# Patient Record
Sex: MALE | Race: WHITE | Employment: OTHER | URBAN - METROPOLITAN AREA
[De-identification: names, ages, dates, MRNs, and addresses within clinical notes are randomized per-mention and may not be internally consistent; named-entity substitution may affect disease eponyms.]

---

## 2017-11-07 ENCOUNTER — TRANSFERRED RECORDS (OUTPATIENT)
Dept: HEALTH INFORMATION MANAGEMENT | Facility: CLINIC | Age: 57
End: 2017-11-07

## 2018-02-05 ENCOUNTER — TRANSFERRED RECORDS (OUTPATIENT)
Dept: HEALTH INFORMATION MANAGEMENT | Facility: CLINIC | Age: 58
End: 2018-02-05

## 2018-02-05 LAB
CREAT SERPL-MCNC: 1.01 MG/DL (ref 0.7–1.2)
GFR SERPL CREATININE-BSD FRML MDRD: >60 ML/MIN/1.73M2
GLUCOSE SERPL-MCNC: 98 MG/DL (ref 70–100)
HEP C HIM: NORMAL
POTASSIUM SERPL-SCNC: 4.6 MEQ/L (ref 3.4–5.1)

## 2018-09-11 ENCOUNTER — TRANSFERRED RECORDS (OUTPATIENT)
Dept: HEALTH INFORMATION MANAGEMENT | Facility: CLINIC | Age: 58
End: 2018-09-11

## 2018-10-18 ENCOUNTER — TELEPHONE (OUTPATIENT)
Dept: UROLOGY | Facility: CLINIC | Age: 58
End: 2018-10-18

## 2018-10-18 NOTE — TELEPHONE ENCOUNTER
PREVISIT INFORMATION                                                    Yovani Renuka scheduled for future visit at [facility].    Patient is scheduled to see Dr. Ashraf on 11/1/18   Reason for visit: Urinary frequency  Referring provider: Azul  Has patient seen previous specialist? No  Medical Records:  Unkown    REVIEW                                                      New patient packet mailed to patient: No  Medication reconciliation complete: No      PLAN/FOLLOW-UP NEEDED                                                      Patient Reminders Given:    Informed patient to bring an updated list of allergies, medications, pharmacy details and insurance information. Directed patient to come to the 2nd floor, check-in D for their appointment. Informed patient to call back if appointment needs to be cancelled or rescheduled at [phone].    Reminded patient to bring any outside records regarding this appointment or have them faxed to clinic at [phone].    Reminded patient to complete and bring in urology questionnaire. Also for patient to please come with a full bladder and to ask , if early to get staff member for sample.    Left message asking pt to return call so outside records can be obtained prior to visit.    Harika Aleman LPN

## 2018-10-19 NOTE — TELEPHONE ENCOUNTER
Returned call and spoke to patient who reports that he is self-referred. Per patient, he was previously seen at [facility]. Requested for patient to contact  to have records faxed to [phone]. Patient aware of appointment date, time, and location and will call with any questions.    Teresita Overton RN, BSN

## 2018-11-01 ENCOUNTER — OFFICE VISIT (OUTPATIENT)
Dept: UROLOGY | Facility: CLINIC | Age: 58
End: 2018-11-01
Payer: COMMERCIAL

## 2018-11-01 VITALS
DIASTOLIC BLOOD PRESSURE: 77 MMHG | HEART RATE: 48 BPM | WEIGHT: 189 LBS | OXYGEN SATURATION: 100 % | SYSTOLIC BLOOD PRESSURE: 121 MMHG

## 2018-11-01 DIAGNOSIS — N52.9 ERECTILE DYSFUNCTION, UNSPECIFIED ERECTILE DYSFUNCTION TYPE: ICD-10-CM

## 2018-11-01 DIAGNOSIS — R35.1 NOCTURIA: ICD-10-CM

## 2018-11-01 DIAGNOSIS — R39.15 URINARY URGENCY: Primary | ICD-10-CM

## 2018-11-01 PROCEDURE — 99203 OFFICE O/P NEW LOW 30 MIN: CPT | Performed by: UROLOGY

## 2018-11-01 RX ORDER — SILDENAFIL CITRATE 20 MG/1
20 TABLET ORAL PRN
Refills: 2 | COMMUNITY
Start: 2018-08-07 | End: 2019-12-03

## 2018-11-01 ASSESSMENT — PAIN SCALES - GENERAL: PAINLEVEL: MILD PAIN (2)

## 2018-11-01 NOTE — NURSING NOTE
Yovani Grayson's goals for this visit include:   Chief Complaint   Patient presents with     Consult     Urinary frequency/urgency       He requests these members of his care team be copied on today's visit information: Yes    PCP: No primary care provider on file.    Referring Provider:  Eliazar Ashraf MD  [address] [place] [id number]    /77 (BP Location: Left arm, Patient Position: Sitting, Cuff Size: Adult Regular)  Pulse (!) 48  Wt 85.7 kg (189 lb)  SpO2 100%    Do you need any medication refills at today's visit? No

## 2018-11-01 NOTE — PROGRESS NOTES
Urology Consult History and Physical  OBED GAMBINO  Name: Yovani Grayson    MRN: [id number]   YOB: 1960       We were asked to see Yovani Grayson at the request of SELF for evaluation and treatment of Urinary frequency and urgency.        Chief Complaint:   Urinary frequency and urgency     History is obtained from the patient            History of Present Illness:   oYvani Grayson is a 57 year old male who is being seen for evaluation of urinary frequency and urgency with nocturia.    He notes that nocturia has been happening 2-3 times per night for several years. 1 month ago he developed worsening of frequency and urgency. This persisted for about 2 weeks and since mostly resolved. He continues to have some urgency and is conscious of PO fluid intake and timing of voids. Overall not too bothered by this.     He has Erectile dysfunction and uses sildenafil with good response.     He was also having bilateral hip pain and stopped taking his Statin and this pain has greatly improved.     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    AUASS: 1-3-1-4-2-1-3 = 15  QOL: 2    PSA  9/12/18     0.50  2/5/18       0.54  5/31/17     0.43    (4 or >)  Location: Prostate  Quality: urgency  Severity: mild  Duration: months           Past Medical History:     Past Medical History:   Diagnosis Date     Erectile dysfunction             Past Surgical History:     Past Surgical History:   Procedure Laterality Date     HERNIORRHAPHY UMBILICAL              Social History:     Social History   Substance Use Topics     Smoking status: Never Smoker     Smokeless tobacco: Never Used     Alcohol use No       History   Smoking Status     Never Smoker   Smokeless Tobacco     Never Used            Family History:   No family history on file.           Allergies:   No Known Allergies         Medications:     Current Outpatient Prescriptions   Medication Sig     sildenafil (REVATIO) 20 MG tablet Take 20  mg by mouth as needed     No current facility-administered medications for this visit.              Review of Systems:     Skin: negative  Eyes: negative  Ears/Nose/Throat: negative  Respiratory: No shortness of breath, dyspnea on exertion, cough, or hemoptysis  Cardiovascular: negative  Gastrointestinal: negative  Genitourinary: as above  Musculoskeletal: negative  Neurologic: negative  Psychiatric: negative  Hematologic/Lymphatic/Immunologic: negative  Endocrine: negative          Physical Exam:     Patient Vitals for the past 24 hrs:   BP Pulse SpO2 Weight   11/01/18 0934 121/77 (!) 48 100 % 85.7 kg (189 lb)     There is no height or weight on file to calculate BMI.     General: age-appropriate appearing male in NAD  HEENT: Head AT/NC, EOMI, CN Grossly intact  Lungs: no respiratory distress, or pursed lip breathing  Heart: No obvious jugular venous distension present  Back: no bony midline tenderness, no CVAT bilaterally.  Abdomen: soft, non-distended, non-tender. No organomegaly  : normal male external genitalia.   Rectal: 25cc gland, no nodules or induration  Lymph: no palpable inguinal lymphadenopathy.  LE: no edema.   Musculoskeltal: extremities normal, no peripheral edema  Skin: no suspicious lesions or rashes  Neuro: Alert, oriented, speech and mentation normal;  moving all 4 extremities equally.  Psych: affect and mood normal          Data:   All laboratory data reviewed:    PSA  9/12/18     0.50  2/5/18       0.54  5/31/17     0.43    Lab Results   Component Value Date    CR 1.01 02/05/2018               Impression and Plan:   Impression:   56 y/o man with history of nocturia, urinary urgency and Erectile dysfunction. The voiding symptoms have largely improved and his main issues is urgency which he is not too bothered by at this time.       Plan:   Nocturia  - We discussed life style modification with limiting evening PO fluid intake and afternoon LE elevation  - He is overall not too bothered by  this    Urinary urgency  - We discussed the possibility of Flomax to improve urination or possibly anti-cholinergic to treat any component of OAB (over-active bladder), however he is not too bothered by these symptoms and prefers to avoid medications  - F/U PRN    Erectile dysfunction   - Doing well with sildenafil  - received script from NP at school system       Time spent: 30 minutes of which >50% was spent counseling.    Eliazar Ashraf MD   Urology  [facility]  [facility] Phone: [phone]  [facility] Phone: [phone]

## 2018-11-01 NOTE — MR AVS SNAPSHOT
After Visit Summary   2018    Yovani Grayson    MRN: [id number]           Patient Information     Date Of Birth          1960        Visit Information        Provider Department      2018 9:30 AM Eliazar Ashraf MD [facility]        Today's Diagnoses     Urinary urgency    -  1    Erectile dysfunction, unspecified erectile dysfunction type        Nocturia           Follow-ups after your visit        Follow-up notes from your care team     Return if symptoms worsen or fail to improve.      Who to contact     If you have questions or need follow up information about today's clinic visit or your schedule please contact [facility] directly at [phone].  Normal or non-critical lab and imaging results will be communicated to you by MyChart, letter or phone within 4 business days after the clinic has received the results. If you do not hear from us within 7 days, please contact the clinic through MyChart or phone. If you have a critical or abnormal lab result, we will notify you by phone as soon as possible.  Submit refill requests through [employer] or call your pharmacy and they will forward the refill request to us. Please allow 3 business days for your refill to be completed.          Additional Information About Your Visit        MyChart Information     [employer] is an electronic gateway that provides easy, online access to your medical records. With [employer], you can request a clinic appointment, read your test results, renew a prescription or communicate with your care team.     To sign up for [employer] visit the website at [email or web address]   You will be asked to enter the access code listed below, as well as some personal information. Please follow the directions to create your username and password.     Your access code is: [email or web address]  Expires: 2019 10:09 AM     Your access code will  in 90 days. If you need help or a new code,  please contact your [facility] or call [phone] for assistance.        Care EveryWhere ID     This is your Care EveryWhere ID. This could be used by other organizations to access your [facility] medical records  [id number]        Your Vitals Were     Pulse Pulse Oximetry                48 100%           Blood Pressure from Last 3 Encounters:   11/01/18 121/77    Weight from Last 3 Encounters:   11/01/18 85.7 kg (189 lb)              Today, you had the following     No orders found for display       Primary Care Provider    None Specified       No primary provider on file.        Equal Access to Services     [facility]: Hadii aad mick hadasho Somarylou, waaxda luqadaha, qaybta kaalmada rosiyakey, sulema rosales . So [facility] [phone].    ATENCIÓN: Si habla español, tiene a vides disposición servicios gratuitos de asistencia lingüística. Llame al [phone].    We comply with applicable federal civil rights laws and [place] laws. We do not discriminate on the basis of race, color, national origin, age, disability, sex, sexual orientation, or gender identity.            Thank you!     Thank you for choosing [facility]  for your care. Our goal is always to provide you with excellent care. Hearing back from our patients is one way we can continue to improve our services. Please take a few minutes to complete the written survey that you may receive in the mail after your visit with us. Thank you!             Your Updated Medication List - Protect others around you: Learn how to safely use, store and throw away your medicines at www.disposemymeds.org.          This list is accurate as of 11/1/18 10:09 AM.  Always use your most recent med list.                   Brand Name Dispense Instructions for use Diagnosis    sildenafil 20 MG tablet    REVATIO     Take 20 mg by mouth as needed

## 2019-02-07 ENCOUNTER — TELEPHONE (OUTPATIENT)
Dept: FAMILY MEDICINE | Facility: CLINIC | Age: 59
End: 2019-02-07

## 2019-02-07 ENCOUNTER — ANCILLARY PROCEDURE (OUTPATIENT)
Dept: GENERAL RADIOLOGY | Facility: CLINIC | Age: 59
End: 2019-02-07
Attending: PHYSICIAN ASSISTANT
Payer: COMMERCIAL

## 2019-02-07 ENCOUNTER — OFFICE VISIT (OUTPATIENT)
Dept: FAMILY MEDICINE | Facility: CLINIC | Age: 59
End: 2019-02-07
Payer: COMMERCIAL

## 2019-02-07 VITALS
SYSTOLIC BLOOD PRESSURE: 110 MMHG | BODY MASS INDEX: 24.77 KG/M2 | HEART RATE: 65 BPM | DIASTOLIC BLOOD PRESSURE: 62 MMHG | HEIGHT: 74 IN | OXYGEN SATURATION: 100 % | RESPIRATION RATE: 18 BRPM | WEIGHT: 193 LBS | TEMPERATURE: 98.1 F

## 2019-02-07 DIAGNOSIS — L98.9 SKIN LESION: Primary | ICD-10-CM

## 2019-02-07 DIAGNOSIS — M25.552 HIP PAIN, LEFT: ICD-10-CM

## 2019-02-07 DIAGNOSIS — Z13.220 SCREENING FOR HYPERLIPIDEMIA: ICD-10-CM

## 2019-02-07 DIAGNOSIS — Z13.1 SCREENING FOR DIABETES MELLITUS: ICD-10-CM

## 2019-02-07 PROCEDURE — 99204 OFFICE O/P NEW MOD 45 MIN: CPT | Performed by: PHYSICIAN ASSISTANT

## 2019-02-07 PROCEDURE — 73502 X-RAY EXAM HIP UNI 2-3 VIEWS: CPT | Mod: FY

## 2019-02-07 RX ORDER — TRIAMCINOLONE ACETONIDE 1 MG/G
OINTMENT TOPICAL 2 TIMES DAILY
Qty: 60 G | Refills: 0 | Status: SHIPPED | OUTPATIENT
Start: 2019-02-07 | End: 2019-04-22

## 2019-02-07 ASSESSMENT — PAIN SCALES - GENERAL: PAINLEVEL: MODERATE PAIN (4)

## 2019-02-07 ASSESSMENT — MIFFLIN-ST. JEOR: SCORE: 1752.25

## 2019-02-07 NOTE — PATIENT INSTRUCTIONS
Follow up with orthopedics at [facility] ([phone]) for left hip pain    Follow up with dermatology- may try triamcinolone cream to lesions twice a day for 14 days to see if helpful     Please schedule a fasting lab appointment (nothing to eat or drink 9 hours prior except water and/or your medications) at your earliest convenience.          Follow up with orthopedics for left hip pain

## 2019-02-07 NOTE — TELEPHONE ENCOUNTER
This writer attempted to contact Yovani on 02/07/19      Reason for call provider's instructions below and left message.      If patient calls back:   Registered Nurse called. Follow Triage Call workflow        Marah Medrano RN

## 2019-02-07 NOTE — PROGRESS NOTES
SUBJECTIVE:   Yovani Grayson is a 58 year old male who presents to clinic today for the following health issues:    Rash  Onset: 6 months    Description:   Location: face, arms, side and leg, back  Character: flakey  Itching (Pruritis): occasionally    Progression of Symptoms:  worsening    Accompanying Signs & Symptoms:  Fever: no   Body aches or joint pain: YES  Sore throat symptoms: no   Recent cold symptoms: no     History:   Previous similar rash: no     Precipitating factors:   Exposure to similar rash: no   New exposures: None   Recent travel: no     Alleviating factors:  none    Therapies Tried and outcome: none      Has had several colonoscopies - used to run marathons and after long run 20 miles or more blood in stool - performed in  [place] [address]- recommended repeating in 10 yrs given normal findings   Moved in 2014    Spots in various areas of body present over the last 6 months   Scaling more on back and larger ones - looks more tender and itches a bit .   Under left arm started more red and pimple in middle  And now not as red- 6 months   Now changed into more flesh colored scaling  Overall worse last six months and cheeks getting more red and rough.   Face doesn't itch. One on back itches a bit  Chronic hip pain left hip 6 months- worse in am and has kept awake at night and to point actually get up and take ibuprofen.  Tried resting it without improvement   Does run -end of October 10K run.  Did Rest but didn't get better   Shoulder right pain as well for approximately one month    Noticeable.  Rates shoulder pain 2/10 hip varies 0/10 to 4/10  Is a   No history of skin cancer no family history  Of skin cancer  Has had some precancerous lesions treated     Problem list and histories reviewed & adjusted, as indicated.  Additional history: as documented    There is no problem list on file for this patient.    Past Surgical History:   Procedure Laterality Date     HERNIORRHAPHY UMBILICAL  "        Social History     Tobacco Use     Smoking status: Never Smoker     Smokeless tobacco: Never Used   Substance Use Topics     Alcohol use: No     Family History   Problem Relation Age of Onset     Hypertension Father          age 59 lung cancer      Lung Cancer Father      Breast Cancer Mother      Diabetes No family hx of      Colon Cancer No family hx of      Coronary Artery Disease No family hx of          Current Outpatient Medications   Medication Sig Dispense Refill            sildenafil (REVATIO) 20 MG tablet Take 20 mg by mouth as needed  2     BP Readings from Last 3 Encounters:   19 110/62   18 121/77    Wt Readings from Last 3 Encounters:   19 87.5 kg (193 lb)   18 85.7 kg (189 lb)                    Reviewed and updated as needed this visit by clinical staff  Tobacco  Allergies  Meds  Problems  Med Hx  Surg Hx  Fam Hx  Soc Hx        Reviewed and updated as needed this visit by Provider  Tobacco  Allergies  Meds  Problems  Med Hx  Surg Hx  Fam Hx  Soc Hx          ROS:   ROS: 10 point ROS neg other than the symptoms noted above in the HPI.    OBJECTIVE:     /62 (BP Location: Right arm, Patient Position: Chair, Cuff Size: Adult Large)   Pulse 65   Temp 98.1  F (36.7  C) (Oral)   Resp 18   Ht 1.867 m (6' 1.5\")   Wt 87.5 kg (193 lb)   SpO2 100%   BMI 25.12 kg/m    Body mass index is 25.12 kg/m .  GENERAL: healthy, alert and no distress  NECK: no adenopathy, no asymmetry, masses, or scars and thyroid normal to palpation  RESP: lungs clear to auscultation - no rales, rhonchi or wheezes  CV: regular rate and rhythm, normal S1 S2, no S3 or S4, no murmur, click or rub, no peripheral edema and peripheral pulses strong  ABDOMEN: soft, nontender, no hepatosplenomegaly, no masses and bowel sounds normal  MS: left hip without tenderness in groin.  Normal range of motion of hip.  Not particularly painful with range of motion   SKIN: several circular " erythematous scaling plaques noted scattered over the body. No areas of central clearing.  Face with thickened scaling red skin as well     Diagnostic Test Results:  Left hip xray with degenerative changes official radiology report pending     ASSESSMENT/PLAN:             1. Skin lesion  Possibly eczema or possibly guttate psoriasis- trial of traimcinolone on body lesions- not on face   - triamcinolone (KENALOG) 0.1 % external ointment; Apply topically 2 times daily for 14 days  Dispense: 60 g; Refill: 0  - DERMATOLOGY REFERRAL    2. Hip pain, left  Referred to orthopedics for further evaluation and consideration of steroid injection   - XR Hip Left 2-3 Views; Future  - ORTHOPEDICS ADULT REFERRAL    3. Screening for diabetes mellitus  Encouraged to return for fasting labs   - Comprehensive metabolic panel; Future    4. Screening for hyperlipidemia  Encouraged to return for fasting labs  Has been on statin in past but nothing recent    - Lipid panel reflex to direct LDL Fasting; Future    Patient Instructions   Follow up with orthopedics at [facility] ([phone]) for left hip pain    Follow up with dermatology- may try triamcinolone cream to lesions twice a day for 14 days to see if helpful     Please schedule a fasting lab appointment (nothing to eat or drink 9 hours prior except water and/or your medications) at your earliest convenience.          Follow up with orthopedics for left hip pain       Eva Viramontes PA-C  [facility]

## 2019-02-07 NOTE — TELEPHONE ENCOUNTER
Please call and advise not to use triamcinolone ointment on face- I dont' think I made that clear at today's appointment - ok to use on rest of body  If gets worse we would think of fungal component- follow up with us if worse and derm if no improvement

## 2019-02-08 NOTE — TELEPHONE ENCOUNTER
Called and informed the patient of the provider information as written below.    Teresita Matos RN, [facility]

## 2019-02-13 DIAGNOSIS — Z13.220 SCREENING FOR HYPERLIPIDEMIA: ICD-10-CM

## 2019-02-13 DIAGNOSIS — Z13.1 SCREENING FOR DIABETES MELLITUS: ICD-10-CM

## 2019-02-13 LAB
ALBUMIN SERPL-MCNC: 3.8 G/DL (ref 3.4–5)
ALP SERPL-CCNC: 79 U/L (ref 40–150)
ALT SERPL W P-5'-P-CCNC: 26 U/L (ref 0–70)
ANION GAP SERPL CALCULATED.3IONS-SCNC: 4 MMOL/L (ref 3–14)
AST SERPL W P-5'-P-CCNC: 22 U/L (ref 0–45)
BILIRUB SERPL-MCNC: 0.6 MG/DL (ref 0.2–1.3)
BUN SERPL-MCNC: 15 MG/DL (ref 7–30)
CALCIUM SERPL-MCNC: 8.5 MG/DL (ref 8.5–10.1)
CHLORIDE SERPL-SCNC: 107 MMOL/L (ref 94–109)
CHOLEST SERPL-MCNC: 246 MG/DL
CO2 SERPL-SCNC: 28 MMOL/L (ref 20–32)
CREAT SERPL-MCNC: 1.1 MG/DL (ref 0.66–1.25)
GFR SERPL CREATININE-BSD FRML MDRD: 73 ML/MIN/{1.73_M2}
GLUCOSE SERPL-MCNC: 92 MG/DL (ref 70–99)
HDLC SERPL-MCNC: 55 MG/DL
LDLC SERPL CALC-MCNC: 162 MG/DL
NONHDLC SERPL-MCNC: 191 MG/DL
POTASSIUM SERPL-SCNC: 4.6 MMOL/L (ref 3.4–5.3)
PROT SERPL-MCNC: 7.6 G/DL (ref 6.8–8.8)
SODIUM SERPL-SCNC: 139 MMOL/L (ref 133–144)
TRIGL SERPL-MCNC: 144 MG/DL

## 2019-02-13 PROCEDURE — 80053 COMPREHEN METABOLIC PANEL: CPT | Performed by: PHYSICIAN ASSISTANT

## 2019-02-13 PROCEDURE — 80061 LIPID PANEL: CPT | Performed by: PHYSICIAN ASSISTANT

## 2019-02-13 PROCEDURE — 36415 COLL VENOUS BLD VENIPUNCTURE: CPT | Performed by: PHYSICIAN ASSISTANT

## 2019-02-13 NOTE — LETTER
[address]  [address]  [phone]    February 13, 2019      Yovani Grayson  [address] [place] [address]        Dear Yovani     Your electrolytes, blood sugar, kidney function and liver function were normal.   Your cholesterol is high. Poor diet, genetics and being overweight can contribute to this.  Maintaining a healthy diet with lean proteins, whole grains and healthy fats such as olive oil as well as regular exercise and maintaining an appropriate weight all contribute to healthier cholesterol levels.     Schedule an appointment to discuss this further and for recommendations for medications.  I would recommend going back on a statin.   Please call or MyChart my office with any questions or concerns.       Eva Viramontes, PAC

## 2019-02-19 ENCOUNTER — OFFICE VISIT (OUTPATIENT)
Dept: ORTHOPEDICS | Facility: CLINIC | Age: 59
End: 2019-02-19
Payer: COMMERCIAL

## 2019-02-19 VITALS
SYSTOLIC BLOOD PRESSURE: 144 MMHG | DIASTOLIC BLOOD PRESSURE: 76 MMHG | BODY MASS INDEX: 24.77 KG/M2 | HEIGHT: 74 IN | RESPIRATION RATE: 13 BRPM | OXYGEN SATURATION: 99 % | HEART RATE: 68 BPM | WEIGHT: 193 LBS

## 2019-02-19 DIAGNOSIS — M16.12 PRIMARY OSTEOARTHRITIS OF LEFT HIP: ICD-10-CM

## 2019-02-19 DIAGNOSIS — M50.10 CERVICAL DISC SYNDROME: ICD-10-CM

## 2019-02-19 PROCEDURE — 99203 OFFICE O/P NEW LOW 30 MIN: CPT | Performed by: ORTHOPAEDIC SURGERY

## 2019-02-19 ASSESSMENT — MIFFLIN-ST. JEOR: SCORE: 1752.25

## 2019-02-19 NOTE — LETTER
2019         RE: Yovani Grayson  [address]  [place] [facility] [address]        Dear Colleague,    Thank you for referring your patient, Yovani Grayson, to the [facility]. Please see a copy of my visit note below.    Yovani Grayson is a 58 year old male who is seen in consultation at the request of Eva Viramontes  for left hip pain and right shoulder pain.     Past Medical History:   Diagnosis Date     Erectile dysfunction        Past Surgical History:   Procedure Laterality Date     HERNIORRHAPHY UMBILICAL         Family History   Problem Relation Age of Onset     Hypertension Father          age 59 lung cancer      Lung Cancer Father      Breast Cancer Mother      Diabetes No family hx of      Colon Cancer No family hx of      Coronary Artery Disease No family hx of        Social History     Socioeconomic History     Marital status:      Spouse name: Not on file     Number of children: Not on file     Years of education: Not on file     Highest education level: Not on file   Occupational History     Not on file   Social Needs     Financial resource strain: Not on file     Food insecurity:     Worry: Not on file     Inability: Not on file     Transportation needs:     Medical: Not on file     Non-medical: Not on file   Tobacco Use     Smoking status: Never Smoker     Smokeless tobacco: Never Used   Substance and Sexual Activity     Alcohol use: No     Drug use: No     Sexual activity: Yes     Partners: Female   Lifestyle     Physical activity:     Days per week: Not on file     Minutes per session: Not on file     Stress: Not on file   Relationships     Social connections:     Talks on phone: Not on file     Gets together: Not on file     Attends Caodaism service: Not on file     Active member of club or organization: Not on file     Attends meetings of clubs or organizations: Not on file     Relationship status: Not on file     Intimate partner violence:     Fear of  "current or ex partner: Not on file     Emotionally abused: Not on file     Physically abused: Not on file     Forced sexual activity: Not on file   Other Topics Concern     Not on file   Social History Narrative     Not on file       Current Outpatient Medications   Medication Sig Dispense Refill     sildenafil (REVATIO) 20 MG tablet Take 20 mg by mouth as needed  2     triamcinolone (KENALOG) 0.1 % external ointment Apply topically 2 times daily for 14 days 60 g 0     pravastatin (PRAVACHOL) 40 MG tablet Take 1 tablet (40 mg) by mouth daily 90 tablet 0       No Known Allergies    REVIEW OF SYSTEMS:  CONSTITUTIONAL:  NEGATIVE for fever, chills, change in weight, not feeling tired  SKIN:  NEGATIVE for worrisome rashes, no skin lumps, no skin ulcers and no non-healing wounds  EYES:  NEGATIVE for vision changes or irritation.  ENT/MOUTH:  NEGATIVE.  No hearing loss, no hoarseness, no difficulty swallowing.  RESP:  NEGATIVE. No cough or shortness of breath.  CV:  NEGATIVE for chest pain, palpitations or peripheral edema  GI:  NEGATIVE for nausea, abdominal pain, heartburn, or change in bowel habits  :  Negative. No dysuria, no hematuria  MUSCULOSKELETAL:  See HPI above  NEURO:  NEGATIVE . No headaches, no dizziness,  no numbness  ENDOCRINE:  NEGATIVE for temperature intolerance, skin/hair changes  HEME/ALLERGY/IMMUNE:  NEGATIVE for bleeding problems  PSYCHIATRIC:  NEGATIVE. no anxiety, no depression.     Exam:  Vitals: /76   Pulse 68   Resp 13   Ht 1.867 m (6' 1.5\")   Wt 87.5 kg (193 lb)   SpO2 99%   BMI 25.12 kg/m     BMI= Body mass index is 25.12 kg/m .  Constitutional:  healthy, alert and no distress  Neuro: Alert and Oriented x 3, Sensation grossly WNL.  Psych: Affect normal   Respiratory: Breathing not labored.  Cardiovascular: normal peripheral pulses  Lymph: no adenopathy  Skin: No rashes,worrisome lesions or skin problems      Again, thank you for allowing me to participate in the care of your " patient.        Sincerely,        Triston Falk MD

## 2019-02-19 NOTE — PATIENT INSTRUCTIONS
Left hip early osteoarthritis and femoral acetabular impingement.  Resume activity as tolerated.  Not overly aggressive.  Aleve up to 4 tablets per day or ibuprofen up to 12 tablets per day.  Take  with food.  Right shoulder pain likely a pinched nerve in neck.  Watch neck posture.

## 2019-02-21 PROBLEM — M50.10 CERVICAL DISC SYNDROME: Status: ACTIVE | Noted: 2019-02-21

## 2019-02-21 PROBLEM — M75.41 IMPINGEMENT SYNDROME OF RIGHT SHOULDER: Status: ACTIVE | Noted: 2019-02-21

## 2019-02-21 PROBLEM — M16.12 PRIMARY OSTEOARTHRITIS OF LEFT HIP: Status: ACTIVE | Noted: 2019-02-21

## 2019-02-21 NOTE — PROGRESS NOTES
Visit Date:   02/19/2019      HISTORY OF PRESENT ILLNESS:  This is a 58-year-old male seen in consultation at the request of Eva Viramontes for evaluation of left hip pain and right shoulder pain.  Hip pain has been ongoing for about 3 years, shoulder more recently about 2 months.  He has had problems with running before and in October did a 10K race and developed increased pain in his left hip.  He has sharp, dull, aching, shooting pain rated between 0-4/10, worse with use and running, better with rest.  X-ray on 02/07/2019 shows moderate osteoarthritis of the hip with elongation of the lateral neck consistent with femoral acetabular impingement.  X-ray has not been obtained of the shoulder.        PHYSICAL EXAMINATION:  Full range of motion of both shoulders.  He does have pain reaching overhead with the right shoulder.  He has mild tenderness of the subacromial space and anterior shoulder joint.  No tenderness at the AC joint.  He has no significant pain with resisted internal rotation.  He has mild pain with resisted external rotation.  No pain with resisted abduction down at his side.  He does have pain with resisted abduction and flexion at 90 degrees but mildly.  He has good strength on the left.  Sensation and circulation are intact.  Hips show 60 degrees of external rotation on the right, 45 degrees external rotation on the left.  There is about 15 degrees of internal rotation on both hips.  He has good flexion and extension of the hips and knees.  He has no tenderness over the greater trochanter.  Sensation and circulation are intact.      IMPRESSION:     1.  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   2.  Right shoulder impingement with rotator cuff tendinosis.      PLAN:  We have gone over strengthening exercises for the shoulder, minimizing overhead forward reaching, anti-inflammatorie as needed.  For the hip, we will use anti-inflammatories.  He  should pursue low-impact activities if possible.  He may use ibuprofen up to 12 per day if he wishes.  We will see him back p.rshaina.         CHENG GREENWOOD MD             D: 2019   T: 2019   MT: AMANDA      Name:     CARLOS HUTTON   MRN:      [id number]        Account:      [id number]   :      1960           Visit Date:   2019      Document: [id number]

## 2019-02-21 NOTE — PROGRESS NOTES
Yovani Grayson is a 58 year old male who is seen in consultation at the request of Eva Viramontes  for left hip pain and right shoulder pain.     Past Medical History:   Diagnosis Date     Erectile dysfunction        Past Surgical History:   Procedure Laterality Date     HERNIORRHAPHY UMBILICAL         Family History   Problem Relation Age of Onset     Hypertension Father          age 59 lung cancer      Lung Cancer Father      Breast Cancer Mother      Diabetes No family hx of      Colon Cancer No family hx of      Coronary Artery Disease No family hx of        Social History     Socioeconomic History     Marital status:      Spouse name: Not on file     Number of children: Not on file     Years of education: Not on file     Highest education level: Not on file   Occupational History     Not on file   Social Needs     Financial resource strain: Not on file     Food insecurity:     Worry: Not on file     Inability: Not on file     Transportation needs:     Medical: Not on file     Non-medical: Not on file   Tobacco Use     Smoking status: Never Smoker     Smokeless tobacco: Never Used   Substance and Sexual Activity     Alcohol use: No     Drug use: No     Sexual activity: Yes     Partners: Female   Lifestyle     Physical activity:     Days per week: Not on file     Minutes per session: Not on file     Stress: Not on file   Relationships     Social connections:     Talks on phone: Not on file     Gets together: Not on file     Attends [employer] service: Not on file     Active member of club or organization: Not on file     Attends meetings of clubs or organizations: Not on file     Relationship status: Not on file     Intimate partner violence:     Fear of current or ex partner: Not on file     Emotionally abused: Not on file     Physically abused: Not on file     Forced sexual activity: Not on file   Other Topics Concern     Not on file   Social History Narrative     Not on file       Current  "Outpatient Medications   Medication Sig Dispense Refill     sildenafil (REVATIO) 20 MG tablet Take 20 mg by mouth as needed  2     triamcinolone (KENALOG) 0.1 % external ointment Apply topically 2 times daily for 14 days 60 g 0     pravastatin (PRAVACHOL) 40 MG tablet Take 1 tablet (40 mg) by mouth daily 90 tablet 0       No Known Allergies    REVIEW OF SYSTEMS:  CONSTITUTIONAL:  NEGATIVE for fever, chills, change in weight, not feeling tired  SKIN:  NEGATIVE for worrisome rashes, no skin lumps, no skin ulcers and no non-healing wounds  EYES:  NEGATIVE for vision changes or irritation.  ENT/MOUTH:  NEGATIVE.  No hearing loss, no hoarseness, no difficulty swallowing.  RESP:  NEGATIVE. No cough or shortness of breath.  CV:  NEGATIVE for chest pain, palpitations or peripheral edema  GI:  NEGATIVE for nausea, abdominal pain, heartburn, or change in bowel habits  :  Negative. No dysuria, no hematuria  MUSCULOSKELETAL:  See HPI above  NEURO:  NEGATIVE . No headaches, no dizziness,  no numbness  ENDOCRINE:  NEGATIVE for temperature intolerance, skin/hair changes  HEME/ALLERGY/IMMUNE:  NEGATIVE for bleeding problems  PSYCHIATRIC:  NEGATIVE. no anxiety, no depression.     Exam:  Vitals: /76   Pulse 68   Resp 13   Ht 1.867 m (6' 1.5\")   Wt 87.5 kg (193 lb)   SpO2 99%   BMI 25.12 kg/m    BMI= Body mass index is 25.12 kg/m .  Constitutional:  healthy, alert and no distress  Neuro: Alert and Oriented x 3, Sensation grossly WNL.  Psych: Affect normal   Respiratory: Breathing not labored.  Cardiovascular: normal peripheral pulses  Lymph: no adenopathy  Skin: No rashes,worrisome lesions or skin problems    "

## 2019-03-13 ENCOUNTER — OFFICE VISIT (OUTPATIENT)
Dept: DERMATOLOGY | Facility: CLINIC | Age: 59
End: 2019-03-13
Attending: PHYSICIAN ASSISTANT
Payer: COMMERCIAL

## 2019-03-13 DIAGNOSIS — L57.8 SUN-DAMAGED SKIN: ICD-10-CM

## 2019-03-13 DIAGNOSIS — L81.4 SOLAR LENTIGO: Primary | ICD-10-CM

## 2019-03-13 DIAGNOSIS — L82.1 SEBORRHEIC KERATOSIS: ICD-10-CM

## 2019-03-13 DIAGNOSIS — L57.0 ACTINIC KERATOSIS: ICD-10-CM

## 2019-03-13 DIAGNOSIS — D22.9 MULTIPLE BENIGN NEVI: ICD-10-CM

## 2019-03-13 DIAGNOSIS — D23.9 DERMATOFIBROMA: ICD-10-CM

## 2019-03-13 DIAGNOSIS — L72.0 EIC (EPIDERMAL INCLUSION CYST): ICD-10-CM

## 2019-03-13 PROCEDURE — 99203 OFFICE O/P NEW LOW 30 MIN: CPT | Mod: 25 | Performed by: DERMATOLOGY

## 2019-03-13 PROCEDURE — 17003 DESTRUCT PREMALG LES 2-14: CPT | Performed by: DERMATOLOGY

## 2019-03-13 PROCEDURE — 17000 DESTRUCT PREMALG LESION: CPT | Performed by: DERMATOLOGY

## 2019-03-13 ASSESSMENT — PAIN SCALES - GENERAL: PAINLEVEL: NO PAIN (0)

## 2019-03-13 NOTE — LETTER
3/13/2019         RE: Yovani Grayson  [address]  [facility] [address]        Dear Colleague,    Thank you for referring your patient, Yovani Grayson, to the [facility]. Please see a copy of my visit note below.    [facility] Dermatology Note      Dermatology Problem List:  1.Hx of AKs  - treated with efudex in the past  - s/p cryotherapy 3/13/2019    Encounter Date: Mar 13, 2019    CC:  Chief Complaint   Patient presents with     Rash     Scaly spots and spot in armpit. Pt has been using triamcinolone 0.1%. Pt uses moisturizer after showers.No personal or family hx of SC.          History of Present Illness:  Mr. Yovani Graysno is a 58 year old male who presents as a referral from Eva Watkins for a rash in the under arms. When asked about the rash, he notes several skin concerns seemed to occur simultaneously. He is unsure if these are related events or separate. He reports it started with dry scaly areas on his whole body several months ago. These seemed to mostly be on the extremities. There are none left now. After noticing these skin findings, he then noticed a spot under his right armpit that had a head to it like a pimple, but had no symptoms to it. Underneath it was a half moon shape bright red area. It has since resolved. Now his left underarm is itchy to him, but there is nothing apparent on the skin. He has been using triamcinolone 0.1% ointment to spot treat which he does think helps. He uses a moisturizer after showers. He has been seen at [facility] in the past for skin checks. No personal or family history of skin cancer. He has a history of cysts, one on the back lanced previously. No other concerns addressed today.       Past Medical History:   Patient Active Problem List   Diagnosis     Primary osteoarthritis of left hip     Cervical disc syndrome     Past Medical History:   Diagnosis Date     Erectile dysfunction      Past Surgical  History:   Procedure Laterality Date     HERNIORRHAPHY UMBILICAL         Social History:  Patient reports that  has never smoked. he has never used smokeless tobacco. He reports that he does not drink alcohol or use drugs. Patient is a .     Family History:  Family History   Problem Relation Age of Onset     Hypertension Father          age 59 lung cancer      Lung Cancer Father      Breast Cancer Mother      Diabetes No family hx of      Colon Cancer No family hx of      Coronary Artery Disease No family hx of      Skin Cancer No family hx of        Medications:  Current Outpatient Medications   Medication Sig Dispense Refill     pravastatin (PRAVACHOL) 40 MG tablet Take 1 tablet (40 mg) by mouth daily 90 tablet 0     sildenafil (REVATIO) 20 MG tablet Take 20 mg by mouth as needed  2       No Known Allergies    Review of Systems:  -Constitutional: Patient is otherwise feeling well, in usual state of health.   -Skin: As above in HPI. No additional skin concerns.    Physical exam:  Vitals: There were no vitals taken for this visit.  GEN: This is a well developed, well-nourished male in no acute distress, in a pleasant mood.    SKIN: Focused examination of the face, neck, chest, abdomen, back, upper extremities, and anterior lower extremities was performed.  - Darnell Type II  - Scattered brown macules on sun exposed areas.  - Gritty pink papule on the right cheek x 3, forehead x 2, left cheek x 2  - Multiple regular brown pigmented macules and papules are identified on the trunk and extremities.   - Atrophic purple macule in right axilla, likely consistent with previous inflamed EIC based on history, no rash present  - No scaly spots on extremities and areas pt previously note, could represent SKs based on history  - There are waxy stuck on tan to brown papules on the extremities, and trunk.  - There is a firm tan/flesh colored papule that dimples with lateral pressure on the right lower  leg.  - No other lesions of concern on areas examined.       Impression/Plan:  1. Sun damaged skin with solar lentigines    Recommend sunscreens SPF #30 or greater, protective clothing and avoidance of tanning beds.    2. Benign findings including: seborrheic keratoses, dermatofibroma    No further intervention required. Patient to report changes.     Patient reassured of the benign nature of these lesions.    3. Multiple clinically benign nevi    No further intervention required. Patient to report changes.     Patient reassured of the benign nature of these lesions.    4. Actinic keratosis. Discussed precancerous nature of these lesions. Recommended treatment to prevent progression to a squamous cell carcinoma.     Cryotherapy procedure note: After verbal consent and discussion of risks and benefits including but no limited to dyspigmentation/scar, blister, and pain, 7 was(were) treated with 1-2mm freeze border for 2 cycles with liquid nitrogen. Post cryotherapy instructions were provided.    5. History of scaly patches, resolved.   6. History of itchy rash, resolved.   7. History of cysts. None currently inflamed. Did not recommend any treatment.       CC Eva Viramontes PA-C on close of this encounter.  Follow-up 1 year for skin exam, sooner for lesions of concern.       Staff Involved:  Scribe/Staff    Scribe Disclosure  I, Shikha Foy, am serving as a scribe to document services personally performed by Dr. Juani Mayo MD, based on data collection and the provider's statements to me.     Provider Disclosure:   The documentation recorded by the scribe accurately reflects the services I personally performed and the decisions made by me.    Juani Mayo MD    Department of Dermatology  [facility]: Phone: [phone], Fax:[phone]  [facility]: Phone: [phone], Fax:  [phone]              Again, thank you for allowing me to participate in the care of your patient.        Sincerely,        Juani Mayo MD

## 2019-03-13 NOTE — NURSING NOTE
Yovani Grayson's goals for this visit include:   Chief Complaint   Patient presents with     Rash     Scaly spots and spot in armpit. Pt has been using triamcinolone 0.1%. Pt uses moisturizer after showers.No personal or family hx of SC.      He requests these members of his care team be copied on today's visit information:    PCP: Guy, [facility]    Referring Provider:  Eva Viramontes PA-C  [address] [place] [address]    There were no vitals taken for this visit.    Do you need any medication refills at today's visit? No    Deena Duke LPN

## 2019-03-13 NOTE — PROGRESS NOTES
[facility] Dermatology Note      Dermatology Problem List:  1.Hx of AKs  - treated with efudex in the past  - s/p cryotherapy 3/13/2019    Encounter Date: Mar 13, 2019    CC:  Chief Complaint   Patient presents with     Rash     Scaly spots and spot in armpit. Pt has been using triamcinolone 0.1%. Pt uses moisturizer after showers.No personal or family hx of SC.          History of Present Illness:  Mr. Yovani Grayson is a 58 year old male who presents as a referral from Eva Watkins for a rash in the under arms. When asked about the rash, he notes several skin concerns seemed to occur simultaneously. He is unsure if these are related events or separate. He reports it started with dry scaly areas on his whole body several months ago. These seemed to mostly be on the extremities. There are none left now. After noticing these skin findings, he then noticed a spot under his right armpit that had a head to it like a pimple, but had no symptoms to it. Underneath it was a half moon shape bright red area. It has since resolved. Now his left underarm is itchy to him, but there is nothing apparent on the skin. He has been using triamcinolone 0.1% ointment to spot treat which he does think helps. He uses a moisturizer after showers. He has been seen at [facility] in the past for skin checks. No personal or family history of skin cancer. He has a history of cysts, one on the back lanced previously. No other concerns addressed today.       Past Medical History:   Patient Active Problem List   Diagnosis     Primary osteoarthritis of left hip     Cervical disc syndrome     Past Medical History:   Diagnosis Date     Erectile dysfunction      Past Surgical History:   Procedure Laterality Date     HERNIORRHAPHY UMBILICAL         Social History:  Patient reports that  has never smoked. he has never used smokeless tobacco. He reports that he does not drink alcohol or use drugs. Patient is a school  .     Family History:  Family History   Problem Relation Age of Onset     Hypertension Father          age 59 lung cancer      Lung Cancer Father      Breast Cancer Mother      Diabetes No family hx of      Colon Cancer No family hx of      Coronary Artery Disease No family hx of      Skin Cancer No family hx of        Medications:  Current Outpatient Medications   Medication Sig Dispense Refill     pravastatin (PRAVACHOL) 40 MG tablet Take 1 tablet (40 mg) by mouth daily 90 tablet 0     sildenafil (REVATIO) 20 MG tablet Take 20 mg by mouth as needed  2       No Known Allergies    Review of Systems:  -Constitutional: Patient is otherwise feeling well, in usual state of health.   -Skin: As above in HPI. No additional skin concerns.    Physical exam:  Vitals: There were no vitals taken for this visit.  GEN: This is a well developed, well-nourished male in no acute distress, in a pleasant mood.    SKIN: Focused examination of the face, neck, chest, abdomen, back, upper extremities, and anterior lower extremities was performed.  - Darnell Type II  - Scattered brown macules on sun exposed areas.  - Gritty pink papule on the right cheek x 3, forehead x 2, left cheek x 2  - Multiple regular brown pigmented macules and papules are identified on the trunk and extremities.   - Atrophic purple macule in right axilla, likely consistent with previous inflamed EIC based on history, no rash present  - No scaly spots on extremities and areas pt previously note, could represent SKs based on history  - There are waxy stuck on tan to brown papules on the extremities, and trunk.  - There is a firm tan/flesh colored papule that dimples with lateral pressure on the right lower leg.  - No other lesions of concern on areas examined.       Impression/Plan:  1. Sun damaged skin with solar lentigines    Recommend sunscreens SPF #30 or greater, protective clothing and avoidance of tanning beds.    2. Benign findings  including: seborrheic keratoses, dermatofibroma    No further intervention required. Patient to report changes.     Patient reassured of the benign nature of these lesions.    3. Multiple clinically benign nevi    No further intervention required. Patient to report changes.     Patient reassured of the benign nature of these lesions.    4. Actinic keratosis. Discussed precancerous nature of these lesions. Recommended treatment to prevent progression to a squamous cell carcinoma.     Cryotherapy procedure note: After verbal consent and discussion of risks and benefits including but no limited to dyspigmentation/scar, blister, and pain, 7 was(were) treated with 1-2mm freeze border for 2 cycles with liquid nitrogen. Post cryotherapy instructions were provided.    5. History of scaly patches, resolved.   6. History of itchy rash, resolved.   7. History of cysts. None currently inflamed. Did not recommend any treatment.       CC Eva Viramontes PA-C on close of this encounter.  Follow-up 1 year for skin exam, sooner for lesions of concern.       Staff Involved:  Scribe/Staff    Scribe Disclosure  I, Shikha Foy, am serving as a scribe to document services personally performed by Dr. Juani Mayo MD, based on data collection and the provider's statements to me.     Provider Disclosure:   The documentation recorded by the scribe accurately reflects the services I personally performed and the decisions made by me.    Juani Mayo MD    Department of Dermatology  [facility][note 3]: Phone: [phone], Fax:[phone]  [facility]: Phone: [phone], Fax: [phone]

## 2019-03-13 NOTE — PATIENT INSTRUCTIONS
Cryotherapy    What is it?    Use of a very cold liquid, such as liquid nitrogen, to freeze and destroy abnormal skin cells that need to be removed    What should I expect?    Tenderness and redness    A small blister that might grow and fill with dark purple blood. There may be crusting.    More than one treatment may be needed if the lesions do not go away.    How do I care for the treated area?    Gently wash the area with your hands when bathing.    Use a thin layer of Vaseline to help with healing. You may use a Band-Aid.     The area should heal within 7-10 days and may leave behind a pink or lighter color.     Do not use an antibiotic or Neosporin ointment.     You may take acetaminophen (Tylenol) for pain.     Call your Doctor if you have:    Severe pain    Signs of infection (warmth, redness, cloudy yellow drainage, and or a bad smell)    Questions or concerns    Who should I call with questions?       [facility]: [phone]       [facility]: [phone]       For urgent needs outside of business hours call the [facility] at [phone]        and ask for the dermatology resident on call

## 2019-04-22 ENCOUNTER — E-VISIT (OUTPATIENT)
Dept: FAMILY MEDICINE | Facility: CLINIC | Age: 59
End: 2019-04-22
Payer: COMMERCIAL

## 2019-04-22 ENCOUNTER — MYC MEDICAL ADVICE (OUTPATIENT)
Dept: FAMILY MEDICINE | Facility: CLINIC | Age: 59
End: 2019-04-22

## 2019-04-22 DIAGNOSIS — R53.83 FATIGUE, UNSPECIFIED TYPE: Primary | ICD-10-CM

## 2019-04-22 DIAGNOSIS — L98.9 SKIN LESION: ICD-10-CM

## 2019-04-22 PROCEDURE — 99207 ZZC NON-BILLABLE SERV PER CHARTING: CPT | Performed by: PHYSICIAN ASSISTANT

## 2019-04-22 RX ORDER — TRIAMCINOLONE ACETONIDE 1 MG/G
OINTMENT TOPICAL 2 TIMES DAILY
Qty: 60 G | Refills: 1 | Status: SHIPPED | OUTPATIENT
Start: 2019-04-22 | End: 2020-09-09

## 2019-04-24 DIAGNOSIS — R53.83 FATIGUE, UNSPECIFIED TYPE: ICD-10-CM

## 2019-04-24 DIAGNOSIS — E78.5 HYPERLIPIDEMIA LDL GOAL <130: ICD-10-CM

## 2019-04-24 LAB
ALBUMIN SERPL-MCNC: 3.9 G/DL (ref 3.4–5)
ALP SERPL-CCNC: 62 U/L (ref 40–150)
ALT SERPL W P-5'-P-CCNC: 27 U/L (ref 0–70)
ANION GAP SERPL CALCULATED.3IONS-SCNC: 4 MMOL/L (ref 3–14)
AST SERPL W P-5'-P-CCNC: 24 U/L (ref 0–45)
BASOPHILS # BLD AUTO: 0 10E9/L (ref 0–0.2)
BASOPHILS NFR BLD AUTO: 0.6 %
BILIRUB SERPL-MCNC: 0.7 MG/DL (ref 0.2–1.3)
BUN SERPL-MCNC: 12 MG/DL (ref 7–30)
CALCIUM SERPL-MCNC: 8.7 MG/DL (ref 8.5–10.1)
CHLORIDE SERPL-SCNC: 110 MMOL/L (ref 94–109)
CHOLEST SERPL-MCNC: 174 MG/DL
CO2 SERPL-SCNC: 26 MMOL/L (ref 20–32)
CREAT SERPL-MCNC: 0.98 MG/DL (ref 0.66–1.25)
DIFFERENTIAL METHOD BLD: NORMAL
EOSINOPHIL # BLD AUTO: 0.1 10E9/L (ref 0–0.7)
EOSINOPHIL NFR BLD AUTO: 2.1 %
ERYTHROCYTE [DISTWIDTH] IN BLOOD BY AUTOMATED COUNT: 13.5 % (ref 10–15)
GFR SERPL CREATININE-BSD FRML MDRD: 84 ML/MIN/{1.73_M2}
GLUCOSE SERPL-MCNC: 86 MG/DL (ref 70–99)
HCT VFR BLD AUTO: 45 % (ref 40–53)
HDLC SERPL-MCNC: 53 MG/DL
HGB BLD-MCNC: 15.4 G/DL (ref 13.3–17.7)
LDLC SERPL CALC-MCNC: 95 MG/DL
LYMPHOCYTES # BLD AUTO: 1.4 10E9/L (ref 0.8–5.3)
LYMPHOCYTES NFR BLD AUTO: 26.8 %
MCH RBC QN AUTO: 30.1 PG (ref 26.5–33)
MCHC RBC AUTO-ENTMCNC: 34.2 G/DL (ref 31.5–36.5)
MCV RBC AUTO: 88 FL (ref 78–100)
MONOCYTES # BLD AUTO: 0.5 10E9/L (ref 0–1.3)
MONOCYTES NFR BLD AUTO: 10.1 %
NEUTROPHILS # BLD AUTO: 3.2 10E9/L (ref 1.6–8.3)
NEUTROPHILS NFR BLD AUTO: 60.4 %
NONHDLC SERPL-MCNC: 121 MG/DL
PLATELET # BLD AUTO: 203 10E9/L (ref 150–450)
POTASSIUM SERPL-SCNC: 4.2 MMOL/L (ref 3.4–5.3)
PROT SERPL-MCNC: 7.3 G/DL (ref 6.8–8.8)
RBC # BLD AUTO: 5.12 10E12/L (ref 4.4–5.9)
SODIUM SERPL-SCNC: 140 MMOL/L (ref 133–144)
TRIGL SERPL-MCNC: 132 MG/DL
TSH SERPL DL<=0.005 MIU/L-ACNC: 3.28 MU/L (ref 0.4–4)
WBC # BLD AUTO: 5.3 10E9/L (ref 4–11)

## 2019-04-24 PROCEDURE — 36415 COLL VENOUS BLD VENIPUNCTURE: CPT | Performed by: PHYSICIAN ASSISTANT

## 2019-04-24 PROCEDURE — 84443 ASSAY THYROID STIM HORMONE: CPT | Performed by: PHYSICIAN ASSISTANT

## 2019-04-24 PROCEDURE — 80053 COMPREHEN METABOLIC PANEL: CPT | Performed by: PHYSICIAN ASSISTANT

## 2019-04-24 PROCEDURE — 85025 COMPLETE CBC W/AUTO DIFF WBC: CPT | Performed by: PHYSICIAN ASSISTANT

## 2019-04-24 PROCEDURE — 80061 LIPID PANEL: CPT | Performed by: PHYSICIAN ASSISTANT

## 2019-04-24 NOTE — RESULT ENCOUNTER NOTE
Krunal Pina  Your electrolytes, blood sugar, kidney function and liver function were normal.    Your thyroid function was normal.   Your cholesterol was normal.  Continue your cholesterol medication as you have been taking.   Your blood counts were normal.   I do not have an explanation for your fatigue.   Please call or MyChart my office with any questions or concerns.    Eva Viramontes, PAC

## 2019-06-05 DIAGNOSIS — E78.5 HYPERLIPIDEMIA LDL GOAL <130: ICD-10-CM

## 2019-06-05 NOTE — TELEPHONE ENCOUNTER
"Requested Prescriptions   Pending Prescriptions Disp Refills     pravastatin (PRAVACHOL) 40 MG tablet 90 tablet 0     Sig: Take 1 tablet (40 mg) by mouth daily       Statins Protocol Failed - 6/5/2019  2:46 PM        Failed - Recent (12 mo) or future (30 days) visit within the authorizing provider's specialty     Patient had office visit in the last 12 months or has a visit in the next 30 days with authorizing provider or within the authorizing provider's specialty.  See \"Patient Info\" tab in inbasket, or \"Choose Columns\" in Meds & Orders section of the refill encounter.              Passed - LDL on file in past 12 months     Recent Labs   Lab Test 04/24/19  0915   LDL 95             Passed - No abnormal creatine kinase in past 12 months     No lab results found.             Passed - Medication is active on med list        Passed - Patient is age 18 or older        pravastatin (PRAVACHOL) 40 MG tablet  Last Written Prescription Date:  2/19/19  Last Fill Quantity: 90,  # refills: 0   Last office visit: 2/7/2019 with prescribing provider:  Eva Viramontes   Future Office Visit:      **Patient says he is now only taking 1/2 tablet daily. For insurance purposes, if you could send us a new RX reflecting this, that would be great.  "

## 2019-06-10 RX ORDER — PRAVASTATIN SODIUM 40 MG
40 TABLET ORAL DAILY
Qty: 90 TABLET | Refills: 0 | Status: SHIPPED | OUTPATIENT
Start: 2019-06-10 | End: 2020-03-12

## 2019-06-10 NOTE — TELEPHONE ENCOUNTER
Routing refill request to provider for review/approval because:  A break in medication  Joselin Boland RN

## 2019-08-20 ENCOUNTER — MYC REFILL (OUTPATIENT)
Dept: FAMILY MEDICINE | Facility: CLINIC | Age: 59
End: 2019-08-20

## 2019-08-20 DIAGNOSIS — E78.5 HYPERLIPIDEMIA LDL GOAL <130: ICD-10-CM

## 2019-08-20 NOTE — TELEPHONE ENCOUNTER
"Requested Prescriptions   Pending Prescriptions Disp Refills     pravastatin (PRAVACHOL) 40 MG tablet  Last Written Prescription Date:  6/10/19  Last Fill Quantity: 90 tablet,  # refills: 0   Last office visit: 2/7/2019 with prescribing provider:  Dr. Burleson   Future Office Visit:     90 tablet 0     Sig: Take 1 tablet (40 mg) by mouth daily       Statins Protocol Passed - 8/20/2019  8:30 AM        Passed - LDL on file in past 12 months     Recent Labs   Lab Test 04/24/19  0915   LDL 95             Passed - No abnormal creatine kinase in past 12 months     No lab results found.             Passed - Recent (12 mo) or future (30 days) visit within the authorizing provider's specialty     Patient had office visit in the last 12 months or has a visit in the next 30 days with authorizing provider or within the authorizing provider's specialty.  See \"Patient Info\" tab in inbasket, or \"Choose Columns\" in Meds & Orders section of the refill encounter.              Passed - Medication is active on med list        Passed - Patient is age 18 or older          "

## 2019-08-22 RX ORDER — PRAVASTATIN SODIUM 40 MG
40 TABLET ORAL DAILY
Qty: 90 TABLET | Refills: 0
Start: 2019-08-22

## 2019-08-22 NOTE — TELEPHONE ENCOUNTER
90 day supply sent on 6/10/19. Should have enough until 10/2019. Too soon to refill.      Cady Gonzalez RN, BSN, PHN

## 2019-09-04 ENCOUNTER — OFFICE VISIT (OUTPATIENT)
Dept: FAMILY MEDICINE | Facility: CLINIC | Age: 59
End: 2019-09-04
Payer: COMMERCIAL

## 2019-09-04 VITALS
DIASTOLIC BLOOD PRESSURE: 98 MMHG | TEMPERATURE: 98 F | OXYGEN SATURATION: 98 % | WEIGHT: 219 LBS | HEIGHT: 74 IN | SYSTOLIC BLOOD PRESSURE: 134 MMHG | HEART RATE: 70 BPM | BODY MASS INDEX: 28.11 KG/M2

## 2019-09-04 DIAGNOSIS — R53.83 FATIGUE, UNSPECIFIED TYPE: ICD-10-CM

## 2019-09-04 DIAGNOSIS — R03.0 ELEVATED BLOOD PRESSURE READING WITHOUT DIAGNOSIS OF HYPERTENSION: ICD-10-CM

## 2019-09-04 DIAGNOSIS — Z12.11 SPECIAL SCREENING FOR MALIGNANT NEOPLASMS, COLON: ICD-10-CM

## 2019-09-04 DIAGNOSIS — G44.209 MUSCLE TENSION HEADACHE: Primary | ICD-10-CM

## 2019-09-04 LAB
ALBUMIN SERPL-MCNC: 3.8 G/DL (ref 3.4–5)
ALP SERPL-CCNC: 75 U/L (ref 40–150)
ALT SERPL W P-5'-P-CCNC: 31 U/L (ref 0–70)
ANION GAP SERPL CALCULATED.3IONS-SCNC: 5 MMOL/L (ref 3–14)
AST SERPL W P-5'-P-CCNC: 28 U/L (ref 0–45)
BASOPHILS # BLD AUTO: 0 10E9/L (ref 0–0.2)
BASOPHILS NFR BLD AUTO: 0.4 %
BILIRUB SERPL-MCNC: 0.7 MG/DL (ref 0.2–1.3)
BUN SERPL-MCNC: 14 MG/DL (ref 7–30)
CALCIUM SERPL-MCNC: 8.9 MG/DL (ref 8.5–10.1)
CHLORIDE SERPL-SCNC: 108 MMOL/L (ref 94–109)
CO2 SERPL-SCNC: 27 MMOL/L (ref 20–32)
CREAT SERPL-MCNC: 1.07 MG/DL (ref 0.66–1.25)
DEPRECATED CALCIDIOL+CALCIFEROL SERPL-MC: 27 UG/L (ref 20–75)
DIFFERENTIAL METHOD BLD: NORMAL
EOSINOPHIL # BLD AUTO: 0.2 10E9/L (ref 0–0.7)
EOSINOPHIL NFR BLD AUTO: 3 %
ERYTHROCYTE [DISTWIDTH] IN BLOOD BY AUTOMATED COUNT: 13.3 % (ref 10–15)
GFR SERPL CREATININE-BSD FRML MDRD: 75 ML/MIN/{1.73_M2}
GLUCOSE SERPL-MCNC: 93 MG/DL (ref 70–99)
HCT VFR BLD AUTO: 48.7 % (ref 40–53)
HGB BLD-MCNC: 16.4 G/DL (ref 13.3–17.7)
LYMPHOCYTES # BLD AUTO: 1.2 10E9/L (ref 0.8–5.3)
LYMPHOCYTES NFR BLD AUTO: 22.4 %
MCH RBC QN AUTO: 30.7 PG (ref 26.5–33)
MCHC RBC AUTO-ENTMCNC: 33.7 G/DL (ref 31.5–36.5)
MCV RBC AUTO: 91 FL (ref 78–100)
MONOCYTES # BLD AUTO: 0.5 10E9/L (ref 0–1.3)
MONOCYTES NFR BLD AUTO: 9.2 %
NEUTROPHILS # BLD AUTO: 3.5 10E9/L (ref 1.6–8.3)
NEUTROPHILS NFR BLD AUTO: 65 %
PLATELET # BLD AUTO: 191 10E9/L (ref 150–450)
POTASSIUM SERPL-SCNC: 4.3 MMOL/L (ref 3.4–5.3)
PROT SERPL-MCNC: 7.3 G/DL (ref 6.8–8.8)
RBC # BLD AUTO: 5.35 10E12/L (ref 4.4–5.9)
SODIUM SERPL-SCNC: 140 MMOL/L (ref 133–144)
TSH SERPL DL<=0.005 MIU/L-ACNC: 3.78 MU/L (ref 0.4–4)
VIT B12 SERPL-MCNC: 518 PG/ML (ref 193–986)
WBC # BLD AUTO: 5.4 10E9/L (ref 4–11)

## 2019-09-04 PROCEDURE — 80053 COMPREHEN METABOLIC PANEL: CPT | Performed by: PREVENTIVE MEDICINE

## 2019-09-04 PROCEDURE — 86618 LYME DISEASE ANTIBODY: CPT | Performed by: PREVENTIVE MEDICINE

## 2019-09-04 PROCEDURE — 82306 VITAMIN D 25 HYDROXY: CPT | Performed by: PREVENTIVE MEDICINE

## 2019-09-04 PROCEDURE — 84443 ASSAY THYROID STIM HORMONE: CPT | Performed by: PREVENTIVE MEDICINE

## 2019-09-04 PROCEDURE — 99214 OFFICE O/P EST MOD 30 MIN: CPT | Performed by: PREVENTIVE MEDICINE

## 2019-09-04 PROCEDURE — 85025 COMPLETE CBC W/AUTO DIFF WBC: CPT | Performed by: PREVENTIVE MEDICINE

## 2019-09-04 PROCEDURE — 36415 COLL VENOUS BLD VENIPUNCTURE: CPT | Performed by: PREVENTIVE MEDICINE

## 2019-09-04 PROCEDURE — 82607 VITAMIN B-12: CPT | Performed by: PREVENTIVE MEDICINE

## 2019-09-04 RX ORDER — METHOCARBAMOL 500 MG/1
500-1000 TABLET, FILM COATED ORAL 4 TIMES DAILY PRN
Qty: 60 TABLET | Refills: 1 | Status: SHIPPED | OUTPATIENT
Start: 2019-09-04 | End: 2019-09-30

## 2019-09-04 ASSESSMENT — MIFFLIN-ST. JEOR: SCORE: 1870.19

## 2019-09-04 ASSESSMENT — PAIN SCALES - GENERAL: PAINLEVEL: NO PAIN (0)

## 2019-09-04 NOTE — RESULT ENCOUNTER NOTE
Yovani,     Vitamin B 12 levels are normal.     Please do not hesitate to call us at [phone] if you have any questions or concerns.    Thank you,    Barbara Curtis MD MPH

## 2019-09-04 NOTE — PROGRESS NOTES
Subjective     Yovani Grayson is a 58 year old male who presents to clinic today for the following health issues:    HPI   Headache  Onset: Feb 2019    Description:   Location: bilateral in the occipital area   Character: dull pain  Frequency:  All the time once a day   Duration:  Could last all day     Intensity: mild    Progression of Symptoms:  worsening    Accompanying Signs & Symptoms:  Stiff neck: YES  Neck or upper back pain: YES  Fever: no   Sinus pressure: no   Nausea or vomiting: no   Dizziness: no   Numbness: no   Weakness: YES  Visual changes: no     History:   Head trauma: no   Family history of migraines: no   Previous tests for headaches: no   Neurologist evaluations: no   Able to do daily activities: YES  Wake with a headaches: YES  Do headaches wake you up: YES  Daily pain medication use: YES  Work/school stressors/changes: no     Precipitating factors:   Does light make it worse: no   Does sound make it worse: no     Alleviating factors:  Does sleep help: YES    Therapies Tried and outcome: Ibuprofen (Advil, Motrin) and Tylenol  Fatigue And Brain fog     Started as brain fog  Headaches have increased  Low grade headache  Has been taking Ibuprofen 800 mg 2 times a day  Snoring not significant  No history of sleep apnea  Usually does not have HA in the morning  Sleep helps  Not increased with coughing, bending  Pain with right side of neck    Starts from neck base and radiates up       Has a blood pressure machine at home  Has gained some weight in the last few months      Lyme disease 10 years ago, was treated for it.   Lives in [facility]     Patient Active Problem List   Diagnosis     Primary osteoarthritis of left hip     Cervical disc syndrome     Hyperlipidemia LDL goal <130     Other male erectile dysfunction     Testicular hypofunction     Past Surgical History:   Procedure Laterality Date     HERNIORRHAPHY UMBILICAL         Social History     Tobacco Use     Smoking status: Never Smoker  "    Smokeless tobacco: Never Used   Substance Use Topics     Alcohol use: No     Family History   Problem Relation Age of Onset     Hypertension Father          age 59 lung cancer      Lung Cancer Father      Breast Cancer Mother      Diabetes No family hx of      Colon Cancer No family hx of      Coronary Artery Disease No family hx of      Skin Cancer No family hx of          Current Outpatient Medications   Medication Sig Dispense Refill     methocarbamol (ROBAXIN) 500 MG tablet Take 1-2 tablets (500-1,000 mg) by mouth 4 times daily as needed for muscle spasms 60 tablet 1     pravastatin (PRAVACHOL) 40 MG tablet Take 1 tablet (40 mg) by mouth daily 90 tablet 0     sildenafil (REVATIO) 20 MG tablet Take 20 mg by mouth as needed  2     triamcinolone (KENALOG) 0.1 % external ointment Apply topically 2 times daily (Patient not taking: Reported on 2019) 60 g 1     No Known Allergies  BP Readings from Last 3 Encounters:   19 (!) 134/98   19 144/76   19 110/62    Wt Readings from Last 3 Encounters:   19 99.3 kg (219 lb)   19 87.5 kg (193 lb)   19 87.5 kg (193 lb)           Reviewed and updated as needed this visit by Provider  Tobacco  Allergies  Meds  Problems  Med Hx  Surg Hx  Fam Hx         Review of Systems   ROS COMP: Constitutional, HEENT, cardiovascular, pulmonary, gi and gu systems are negative, except as otherwise noted.      Objective    BP (!) 134/98 (BP Location: Left arm, Patient Position: Chair, Cuff Size: Adult Large)   Pulse 70   Temp 98  F (36.7  C) (Oral)   Ht 1.867 m (6' 1.5\")   Wt 99.3 kg (219 lb)   SpO2 98%   BMI 28.50 kg/m    Body mass index is 28.5 kg/m .  Physical Exam     GENERAL APPEARANCE: healthy, alert and no distress  EYES: Eyes grossly normal to inspection and conjunctivae and sclerae normal  NECK: no adenopathy and trachea midline and normal to palpation  Tender and tight along the right trapezius muscle   RESP: lungs clear to " "auscultation - no rales, rhonchi or wheezes  CV: regular rates and rhythm, normal S1 S2, no S3 or S4 and no murmur, click or rub  ABDOMEN: soft, non-tender and no rebound or guarding   MS: extremities normal- no gross deformities noted and peripheral pulses normal  SKIN: no suspicious lesions or rashes  NEURO: Normal strength and tone, mentation intact and speech normal, DTRs normal lower extremity, able to heel and toe walk, Romberg negative   PSYCH: mentation appears normal      Diagnostic Test Results:  Labs reviewed in Epic  No results found for this or any previous visit (from the past 24 hour(s)).        Assessment & Plan     Yovani was seen today for headache and fatigue.    Diagnoses and all orders for this visit:    Muscle tension headache  -     methocarbamol (ROBAXIN) 500 MG tablet; Take 1-2 tablets (500-1,000 mg) by mouth 4 times daily as needed for muscle spasms  -heat application  -gentle stretching exercises  -Avoid Ibuprofen for now (prevent rebound headaches)  -Tylenol 1000 mg every 6-8 hours as needed maximum 3 times a week  -Sedation side effect of medication reviewed  -if not better in 2-4 weeks then consider imaging and Neurology referral     Fatigue, unspecified type  -     Vitamin D Deficiency  -     Vitamin B12  -     TSH with free T4 reflex  -     CBC with platelets differential  -     Comprehensive metabolic panel  -     Lyme Disease Kacie with reflex to WB Serum  -await labs  -no history of snoring     Elevated blood pressure reading without diagnosis of hypertension  -checks blood pressure at home and has been normal  -will follow up in clinic if over 140/90  -weight loss    Special screening for malignant neoplasms, colon  -colonoscopy done previously        BMI:   Estimated body mass index is 28.5 kg/m  as calculated from the following:    Height as of this encounter: 1.867 m (6' 1.5\").    Weight as of this encounter: 99.3 kg (219 lb).   Weight management plan: Discussed healthy diet and " exercise guidelines        Work on weight loss  Regular exercise    Return in about 4 weeks (around 10/2/2019), or if symptoms worsen or fail to improve.    Barbara Curtis MD MPH    [facility]

## 2019-09-04 NOTE — RESULT ENCOUNTER NOTE
Yovani,    Basic blood count does not show anemia or infection. Other labs are pending.     Please do not hesitate to call us at [phone] if you have any questions or concerns.    Thank you,    Barbara Curtis MD MPH

## 2019-09-04 NOTE — RESULT ENCOUNTER NOTE
Yovani,     Electrolytes, glucose, kidney function, liver function and thyroid function are normal.   Other labs are pending.     Please do not hesitate to call us at [phone] if you have any questions or concerns.    Thank you,    Barbara Curtis MD MPH

## 2019-09-05 LAB — B BURGDOR IGG+IGM SER QL: 0.31 (ref 0–0.89)

## 2019-09-05 NOTE — RESULT ENCOUNTER NOTE
Yovani,     Vitamin D levels are normal.     Please do not hesitate to call us at [phone] if you have any questions or concerns.    Thank you,    Barbara Curtis MD MPH

## 2019-09-06 NOTE — RESULT ENCOUNTER NOTE
Yovani,     Test for Lyme disease was negative.    Please do not hesitate to call us at [phone] if you have any questions or concerns.    Thank you,    Barbara Curtis MD MPH

## 2019-09-23 ENCOUNTER — MYC MEDICAL ADVICE (OUTPATIENT)
Dept: FAMILY MEDICINE | Facility: CLINIC | Age: 59
End: 2019-09-23

## 2019-09-23 NOTE — TELEPHONE ENCOUNTER
E-visit instructions given to Yovani to further discuss ongoing fatigue and headaches.     Cady Gonzalez RN, BSN, PHN

## 2019-09-27 ENCOUNTER — E-VISIT (OUTPATIENT)
Dept: FAMILY MEDICINE | Facility: CLINIC | Age: 59
End: 2019-09-27
Payer: COMMERCIAL

## 2019-09-27 DIAGNOSIS — G44.209 MUSCLE TENSION HEADACHE: Primary | ICD-10-CM

## 2019-09-27 PROCEDURE — 99444 ZZC PHYSICIAN ONLINE EVALUATION & MANAGEMENT SERVICE: CPT | Performed by: PREVENTIVE MEDICINE

## 2019-09-30 RX ORDER — METHOCARBAMOL 500 MG/1
500-1000 TABLET, FILM COATED ORAL 4 TIMES DAILY PRN
Qty: 60 TABLET | Refills: 1 | Status: SHIPPED | OUTPATIENT
Start: 2019-09-30 | End: 2020-09-09

## 2019-10-25 ENCOUNTER — PRE VISIT (OUTPATIENT)
Dept: NEUROLOGY | Facility: CLINIC | Age: 59
End: 2019-10-25

## 2019-10-25 RX ORDER — IBUPROFEN 200 MG
800 TABLET ORAL EVERY 4 HOURS PRN
COMMUNITY

## 2019-10-25 NOTE — TELEPHONE ENCOUNTER
October 25, 2019              Itzel Yo CMA     Chief Complaint   Patient presents with     Previsit     Completed       Pre-Visit Documentation: Yovani Grayson is a 58 year old male  - Patient stated he has never seen a neurologist and has not had imaging done of his head in the last few years    Current Outpatient Medications   Medication Sig Dispense Refill     ibuprofen (ADVIL/MOTRIN) 200 MG tablet Take 200 mg by mouth every 4 hours as needed for mild pain       methocarbamol (ROBAXIN) 500 MG tablet Take 1-2 tablets (500-1,000 mg) by mouth 4 times daily as needed for muscle spasms 60 tablet 1     pravastatin (PRAVACHOL) 40 MG tablet Take 1 tablet (40 mg) by mouth daily 90 tablet 0     sildenafil (REVATIO) 20 MG tablet Take 20 mg by mouth as needed  2     triamcinolone (KENALOG) 0.1 % external ointment Apply topically 2 times daily (Patient not taking: Reported on 9/4/2019) 60 g 1       ASSESSMENT / PLAN:  No diagnosis found.    Itzel Yo CMA

## 2019-10-30 ENCOUNTER — OFFICE VISIT (OUTPATIENT)
Dept: NEUROLOGY | Facility: CLINIC | Age: 59
End: 2019-10-30
Attending: PREVENTIVE MEDICINE
Payer: COMMERCIAL

## 2019-10-30 VITALS
BODY MASS INDEX: 27.01 KG/M2 | WEIGHT: 207.5 LBS | SYSTOLIC BLOOD PRESSURE: 120 MMHG | OXYGEN SATURATION: 98 % | DIASTOLIC BLOOD PRESSURE: 80 MMHG | HEART RATE: 69 BPM

## 2019-10-30 DIAGNOSIS — G44.209 TENSION HEADACHE: Primary | ICD-10-CM

## 2019-10-30 PROCEDURE — 99204 OFFICE O/P NEW MOD 45 MIN: CPT | Performed by: PSYCHIATRY & NEUROLOGY

## 2019-10-30 RX ORDER — NORTRIPTYLINE HCL 10 MG
10 CAPSULE ORAL SEE ADMIN INSTRUCTIONS
Qty: 60 CAPSULE | Refills: 1 | Status: SHIPPED | OUTPATIENT
Start: 2019-10-30 | End: 2020-06-15

## 2019-10-30 ASSESSMENT — PAIN SCALES - GENERAL: PAINLEVEL: NO PAIN (0)

## 2019-10-30 NOTE — PROGRESS NOTES
"Visit Date:   10/30/2019      NEUROLOGY CONSULTATION      HISTORY OF PRESENT ILLNESS:  This patient is a 58-year-old right-handed man seen for evaluation of brain fog and headache.  He is here with his wife, Nighat.  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  In the early spring, he would get a \"brain fog.\"  It would come and go in streaks.  It was always present in the morning.  He did have to wake up about 4:30 in order to begin driving on time.  Usually, he would get up and feel fine, but as the morning wore on, the brain fog would descend.  He would have it for 2-3 days in a row and then it would lift.  It would last a couple of hours.  He had an evacuation drill on the school bus and during this, lost the keys, which bothered him greatly.  He was able to find the keys eventually.  He also would experience extreme fatigue around noon.  He would have to sleep for an hour before going back to drive the school bus.  When he sleeps, he does not snore.  He had been drinking a lot of coffee in the morning and so stopped it for 2 months, but that did not seem to help at all.  Then in March, his daughter had her wedding.  That went well.  When it was over, the brain fog stopped, but then he began to have a low-grade headache.  The headaches are present pretty much on a daily basis.  They begin in the back of the neck and radiate up to the top of the head and into the forehead.  He also has had pain in the right shoulder and across the trapezius into the base of the neck.  This has been present for about a year.  He has headaches all day every day, and they worsen as the day goes on.  He describes the headaches as annoying and low grade, not incapacitating.  There is no photophobia, phonophobia or nausea.  Ibuprofen does help the headache.  He does not have any visual symptoms with the headache or other problems with his vision.  He has no problem with hearing, speech or swallowing.  He has tried methocarbamol, " and that has helped.  The headache has been an issue the entire summer.  Now in the last couple of weeks, the brain fog is back.  He cannot seem to turn his mind off at night.  He is not engaged in social situations.  He feels like there is a weight on the top of his head.  Last night, he was going to the bathroom about every 20 minutes.  He thinks he is getting somewhat anxious about this, but he is not depressed.  He does walk every day and lifts weights.  He is fairly social.  However, his wife has commented that he seems to have lost interest in things.  He does not have focal symptoms in his arms or legs but does get an occasional restless leg symptom.      PAST MEDICAL HISTORY:  Largely noncontributory.  He does have elevated cholesterol and is on medicine for that.  He does not have high blood pressure, diabetes, thyroid or asthma.  He has not had pertinent surgery or trauma to the head or neck.  He rarely drinks alcohol.  He does not smoke.  He did fill out a medical history form, including a 14-point review of systems.  There is no additional information besides what is noted above. He did have extensive laboratory workup through Dr. Curtis.  This includes a normal vitamin D and a normal vitamin B12 level.  TSH, CBC, metabolic panel all normal and Lyme titer negative.      SOCIAL HISTORY:  He is retired from management.  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      FAMILY HISTORY:  Positive for cancer.      PHYSICAL EXAMINATION:   GENERAL:  The patient is cooperative and in no distress.   VITAL SIGNS:  His blood pressure is 120/80.   NECK:  There are no carotid bruits.   HEART:  Auscultation of the heart shows S1 and S2.   NEUROLOGIC:  The patient is alert, oriented and lucid.  He scored 32/33 on a bedside cognitive assessment. Cranial nerve testing shows full visual fields to confrontation.  Funduscopic exam shows sharp discs bilaterally.  Eye movements are complete and conjugate without nystagmus.  Pupils react to light.   "Facial sensation is normal.  Face moves symmetrically.  Palate elevates in the midline.  Tongue protrudes in the midline.  Motor evaluation shows no pronator drift, normal finger-tapping, finger-nose-finger and heel-knee-shin.  The patient has good strength in the arms and legs.  Muscle stretch reflexes are low amplitude and symmetric.  Toes are downgoing.  Sensory exam shows preserved vibration and temperature.  Romberg sign is absent.  He can walk on his heels and toes.  His tandem gait is somewhat clumsy.      ASSESSMENT:  Tension headache syndrome and \"brain fog.\"      DISCUSSION:  This patient is seen for evaluation of tension headache syndrome and symptoms of brain fog, which are not well described.  His exam is normal.  With regard to the headache, I reviewed differential diagnoses with him.  MRI brain will be obtained to rule out any structural lesions.  For treatment, I am starting him on nortriptyline, building up to 20 mg at night.  Target symptom is to improve the symptoms of tension headache but also try and improve his sleep and perhaps take the edge off some of his anxiety.       Followup will be in the next 3-4 weeks.  I encouraged him to call if there are questions or problems.  Side effects of nortriptyline were reviewed, including heart palpitations.  If he has difficulty with the medicine, he knows to stop it and contact me.         INGRID LOPEZ MD             D: 10/30/2019   T: 10/30/2019   MT: LATA      Name:     CARLOS HUTTON   MRN:      [id number]        Account:      [id number]   :      1960           Visit Date:   10/30/2019      Document: [id number]           addendum: reviewed normal brain MRI with pt. F/u as sched.    "

## 2019-10-30 NOTE — LETTER
"    10/30/2019         RE: Yovani Grayson  [address]   [place] [address]        Dear Colleague,    Thank you for referring your patient, Yovani Grayson, to the [facility]. Please see a copy of my visit note below.    Visit Date:   10/30/2019      NEUROLOGY CONSULTATION      HISTORY OF PRESENT ILLNESS:  This patient is a 58-year-old right-handed man seen for evaluation of brain fog and headache.  He is here with his wife, Nighat.  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  In the early spring, he would get a \"brain fog.\"  It would come and go in streaks.  It was always present in the morning.  He did have to wake up about 4:30 in order to begin driving on time.  Usually, he would get up and feel fine, but as the morning wore on, the brain fog would descend.  He would have it for 2-3 days in a row and then it would lift.  It would last a couple of hours.  He had an evacuation drill on the school bus and during this, lost the keys, which bothered him greatly.  He was able to find the keys eventually.  He also would experience extreme fatigue around noon.  He would have to sleep for an hour before going back to drive the school bus.  When he sleeps, he does not snore.  He had been drinking a lot of coffee in the morning and so stopped it for 2 months, but that did not seem to help at all.  Then in March, his daughter had her wedding.  That went well.  When it was over, the brain fog stopped, but then he began to have a low-grade headache.  The headaches are present pretty much on a daily basis.  They begin in the back of the neck and radiate up to the top of the head and into the forehead.  He also has had pain in the right shoulder and across the trapezius into the base of the neck.  This has been present for about a year.  He has headaches all day every day, and they worsen as the day goes on.  He describes the headaches as annoying and low grade, not incapacitating.  There is " no photophobia, phonophobia or nausea.  Ibuprofen does help the headache.  He does not have any visual symptoms with the headache or other problems with his vision.  He has no problem with hearing, speech or swallowing.  He has tried methocarbamol, and that has helped.  The headache has been an issue the entire summer.  Now in the last couple of weeks, the brain fog is back.  He cannot seem to turn his mind off at night.  He is not engaged in social situations.  He feels like there is a weight on the top of his head.  Last night, he was going to the bathroom about every 20 minutes.  He thinks he is getting somewhat anxious about this, but he is not depressed.  He does walk every day and lifts weights.  He is fairly social.  However, his wife has commented that he seems to have lost interest in things.  He does not have focal symptoms in his arms or legs but does get an occasional restless leg symptom.      PAST MEDICAL HISTORY:  Largely noncontributory.  He does have elevated cholesterol and is on medicine for that.  He does not have high blood pressure, diabetes, thyroid or asthma.  He has not had pertinent surgery or trauma to the head or neck.  He rarely drinks alcohol.  He does not smoke.  He did fill out a medical history form, including a 14-point review of systems.  There is no additional information besides what is noted above. He did have extensive laboratory workup through Dr. Curtis.  This includes a normal vitamin D and a normal vitamin B12 level.  TSH, CBC, metabolic panel all normal and Lyme titer negative.      SOCIAL HISTORY:  He is retired from management.  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      FAMILY HISTORY:  Positive for cancer.      PHYSICAL EXAMINATION:   GENERAL:  The patient is cooperative and in no distress.   VITAL SIGNS:  His blood pressure is 120/80.   NECK:  There are no carotid bruits.   HEART:  Auscultation of the heart shows S1 and S2.   NEUROLOGIC:  The patient is alert, oriented and lucid.  He  "scored 32/33 on a bedside cognitive assessment. Cranial nerve testing shows full visual fields to confrontation.  Funduscopic exam shows sharp discs bilaterally.  Eye movements are complete and conjugate without nystagmus.  Pupils react to light.  Facial sensation is normal.  Face moves symmetrically.  Palate elevates in the midline.  Tongue protrudes in the midline.  Motor evaluation shows no pronator drift, normal finger-tapping, finger-nose-finger and heel-knee-shin.  The patient has good strength in the arms and legs.  Muscle stretch reflexes are low amplitude and symmetric.  Toes are downgoing.  Sensory exam shows preserved vibration and temperature.  Romberg sign is absent.  He can walk on his heels and toes.  His tandem gait is somewhat clumsy.      ASSESSMENT:  Tension headache syndrome and \"brain fog.\"      DISCUSSION:  This patient is seen for evaluation of tension headache syndrome and symptoms of brain fog, which are not well described.  His exam is normal.  With regard to the headache, I reviewed differential diagnoses with him.  MRI brain will be obtained to rule out any structural lesions.  For treatment, I am starting him on nortriptyline, building up to 20 mg at night.  Target symptom is to improve the symptoms of tension headache but also try and improve his sleep and perhaps take the edge off some of his anxiety.       Followup will be in the next 3-4 weeks.  I encouraged him to call if there are questions or problems.  Side effects of nortriptyline were reviewed, including heart palpitations.  If he has difficulty with the medicine, he knows to stop it and contact me.         INGRID LOPEZ MD             D: 10/30/2019   T: 10/30/2019   MT: LATA      Name:     CARLOS HUTTON   MRN:      [id number]        Account:      [id number]   :      1960           Visit Date:   10/30/2019      Document: [id number]        Again, thank you for allowing me to participate in the care of your " patient.        Sincerely,        Vince Dias MD

## 2019-11-05 ENCOUNTER — IMMUNIZATION (OUTPATIENT)
Dept: NURSING | Facility: CLINIC | Age: 59
End: 2019-11-05
Payer: COMMERCIAL

## 2019-11-05 DIAGNOSIS — Z23 NEED FOR PROPHYLACTIC VACCINATION AND INOCULATION AGAINST INFLUENZA: Primary | ICD-10-CM

## 2019-11-05 PROCEDURE — 99207 ZZC NO CHARGE NURSE ONLY: CPT

## 2019-11-05 PROCEDURE — 90471 IMMUNIZATION ADMIN: CPT

## 2019-11-05 PROCEDURE — 90682 RIV4 VACC RECOMBINANT DNA IM: CPT

## 2019-11-06 ENCOUNTER — ANCILLARY PROCEDURE (OUTPATIENT)
Dept: MRI IMAGING | Facility: CLINIC | Age: 59
End: 2019-11-06
Attending: PSYCHIATRY & NEUROLOGY
Payer: COMMERCIAL

## 2019-11-06 DIAGNOSIS — G44.209 TENSION HEADACHE: ICD-10-CM

## 2019-11-06 PROCEDURE — 70551 MRI BRAIN STEM W/O DYE: CPT | Performed by: RADIOLOGY

## 2019-11-13 ENCOUNTER — OFFICE VISIT (OUTPATIENT)
Dept: NEUROLOGY | Facility: CLINIC | Age: 59
End: 2019-11-13
Payer: COMMERCIAL

## 2019-11-13 VITALS
WEIGHT: 207 LBS | HEART RATE: 72 BPM | BODY MASS INDEX: 26.94 KG/M2 | OXYGEN SATURATION: 98 % | SYSTOLIC BLOOD PRESSURE: 123 MMHG | DIASTOLIC BLOOD PRESSURE: 81 MMHG

## 2019-11-13 DIAGNOSIS — G44.209 TENSION HEADACHE: Primary | ICD-10-CM

## 2019-11-13 PROCEDURE — 99213 OFFICE O/P EST LOW 20 MIN: CPT | Performed by: PSYCHIATRY & NEUROLOGY

## 2019-11-13 ASSESSMENT — PAIN SCALES - GENERAL: PAINLEVEL: NO PAIN (0)

## 2019-11-13 NOTE — NURSING NOTE
Yovani Grayson's goals for this visit include:   Chief Complaint   Patient presents with     RECHECK       He requests these members of his care team be copied on today's visit information:     PCP: [facility], [place]    Referring Provider:  No referring provider defined for this encounter.    /81 (BP Location: Left arm, Patient Position: Sitting, Cuff Size: Adult Regular)   Pulse 72   Wt 93.9 kg (207 lb)   SpO2 98%   BMI 26.94 kg/m      Do you need any medication refills at today's visit? No

## 2019-11-13 NOTE — PROGRESS NOTES
"Visit Date:   2019      HISTORY OF PRESENT ILLNESS:  This patient is seen in followup with tension headache and \"brain fog.\"  He is here with his wife, Nighat.  I initially saw this patient about 3 weeks ago.  I started him on nortriptyline to try and improve his sleep and also help with tension headache.  He reports he is doing better.  His headache is better.  In fact, he has not really had a headache until today.  This is the first headache in a couple of weeks.  His sleep is improved.  The brain fog is gone.      I did obtain an MRI scan of the brain.  I reviewed those images with him.  The study is completely normal.      PHYSICAL EXAMINATION:   GENERAL:  The patient is cooperative and in no distress.   VITAL SIGNS:  His blood pressure is 123/81.   NEUROLOGIC:  Visual acuity 20/20 bilaterally.  Funduscopic exam shows sharp discs bilaterally.      ASSESSMENT: Tension headache syndrome and \"brain fog.\"      DISCUSSION:  The patient is seen in followup with the above issue.  He has tension headache syndrome.  Etiology of the brain fog is not completely determined, but has resolved with low-dose nortriptyline.  I advised him that he could reduce the dose to 10 mg.  That may be all he needs.  He is inclined to monitor his neurologic status for now and continue the nortriptyline and maybe reduce the dose at some point.  Followup will be on an as as-needed basis.         INGRID LOPEZ MD             D: 2019   T: 2019   MT: LATA      Name:     CARLOS HUTTON   MRN:      [id number]        Account:      [id number]   :      1960           Visit Date:   2019      Document: [id number]      "

## 2019-11-13 NOTE — LETTER
"    2019         RE: Yovani Grayson  [address]   [place] [address]        Dear Colleague,    Thank you for referring your patient, Yovani Grayson, to the [facility]. Please see a copy of my visit note below.    Visit Date:   2019      HISTORY OF PRESENT ILLNESS:  This patient is seen in followup with tension headache and \"brain fog.\"  He is here with his wife, Nighat.  I initially saw this patient about 3 weeks ago.  I started him on nortriptyline to try and improve his sleep and also help with tension headache.  He reports he is doing better.  His headache is better.  In fact, he has not really had a headache until today.  This is the first headache in a couple of weeks.  His sleep is improved.  The brain fog is gone.      I did obtain an MRI scan of the brain.  I reviewed those images with him.  The study is completely normal.      PHYSICAL EXAMINATION:   GENERAL:  The patient is cooperative and in no distress.   VITAL SIGNS:  His blood pressure is 123/81.   NEUROLOGIC:  Visual acuity 20/20 bilaterally.  Funduscopic exam shows sharp discs bilaterally.      ASSESSMENT: Tension headache syndrome and \"brain fog.\"      DISCUSSION:  The patient is seen in followup with the above issue.  He has tension headache syndrome.  Etiology of the brain fog is not completely determined, but has resolved with low-dose nortriptyline.  I advised him that he could reduce the dose to 10 mg.  That may be all he needs.  He is inclined to monitor his neurologic status for now and continue the nortriptyline and maybe reduce the dose at some point.  Followup will be on an as as-needed basis.         INGRID LOPEZ MD             D: 2019   T: 2019   MT: LATA      Name:     YOVANI GRAYSON   MRN:      [id number]        Account:      [id number]   :      1960           Visit Date:   2019      Document: [id number]        Again, thank you for allowing me to participate in the " care of your patient.        Sincerely,        Vince Dias MD

## 2019-12-03 ENCOUNTER — OFFICE VISIT (OUTPATIENT)
Dept: UROLOGY | Facility: CLINIC | Age: 59
End: 2019-12-03
Payer: COMMERCIAL

## 2019-12-03 VITALS
WEIGHT: 207 LBS | HEART RATE: 69 BPM | OXYGEN SATURATION: 99 % | BODY MASS INDEX: 26.56 KG/M2 | SYSTOLIC BLOOD PRESSURE: 139 MMHG | DIASTOLIC BLOOD PRESSURE: 91 MMHG | HEIGHT: 74 IN

## 2019-12-03 DIAGNOSIS — R35.1 NOCTURIA: ICD-10-CM

## 2019-12-03 DIAGNOSIS — R39.15 URINARY URGENCY: Primary | ICD-10-CM

## 2019-12-03 DIAGNOSIS — Z12.5 SCREENING PSA (PROSTATE SPECIFIC ANTIGEN): ICD-10-CM

## 2019-12-03 DIAGNOSIS — N52.9 ERECTILE DYSFUNCTION, UNSPECIFIED ERECTILE DYSFUNCTION TYPE: ICD-10-CM

## 2019-12-03 DIAGNOSIS — R39.9 LOWER URINARY TRACT SYMPTOMS (LUTS): ICD-10-CM

## 2019-12-03 PROCEDURE — 99214 OFFICE O/P EST MOD 30 MIN: CPT | Performed by: UROLOGY

## 2019-12-03 RX ORDER — TAMSULOSIN HYDROCHLORIDE 0.4 MG/1
0.4 CAPSULE ORAL DAILY
Qty: 90 CAPSULE | Refills: 3 | Status: SHIPPED | OUTPATIENT
Start: 2019-12-03 | End: 2020-10-21

## 2019-12-03 RX ORDER — SILDENAFIL CITRATE 20 MG/1
20 TABLET ORAL PRN
Qty: 10 TABLET | Refills: 11 | Status: SHIPPED | OUTPATIENT
Start: 2019-12-03 | End: 2020-10-21

## 2019-12-03 ASSESSMENT — MIFFLIN-ST. JEOR: SCORE: 1823.7

## 2019-12-03 NOTE — NURSING NOTE
"Yovani Grayson's goals for this visit include:   Chief Complaint   Patient presents with     Follow Up     urinary frequency       He requests these members of his care team be copied on today's visit information: None    PCP: [facility], [facility]    Referring Provider:  No referring provider defined for this encounter.    BP (!) 139/91 (BP Location: Left arm, Patient Position: Sitting, Cuff Size: Adult Regular)   Pulse 69   Ht 1.88 m (6' 2\")   Wt 93.9 kg (207 lb)   SpO2 99%   BMI 26.58 kg/m      Do you need any medication refills at today's visit? No    post void residual = 66 mL    Dee Saini LPN    "

## 2019-12-03 NOTE — PROGRESS NOTES
"[facility]   CHIEF COMPLAINT   It was my pleasure to see Yovani Grayson who is a 58 year old male for follow-up of LUTS and nocturia.      HPI   Yovani Grayson is a very pleasant 58 year old male    Initially seen 11/1/18:  \"He notes that nocturia has been happening 2-3 times per night for several years. 1 month ago he developed worsening of frequency and urgency. This persisted for about 2 weeks and since mostly resolved. He continues to have some urgency and is conscious of PO fluid intake and timing of voids. Overall not too bothered by this.      He has Erectile dysfunction and uses sildenafil with good response.      He was also having bilateral hip pain and stopped taking his Statin and this pain has greatly improved.      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     AUASS: 1-3-1-4-2-1-3 = 15  QOL: 2\"     TODAY:  At last visit he was counseled on treatment options and tamsulosin 0.4mg (Flomax) and life style modifications were discussed. He did not want to start on tamsulosin 0.4mg (Flomax).     He has been good about cutting out PO fluid intake about 2-3 hours prior to bed.   His symptoms have worsened with worsening of flow and frequency.     AUASS: 1-5-3-5-2-2-5 = 23  QOL = 4  PVR = 66cc     PHYSICAL EXAM  Patient is a 58 year old  male   Vitals: Blood pressure (!) 139/91, pulse 69, height 1.88 m (6' 2\"), weight 93.9 kg (207 lb), SpO2 99 %.  General Appearance Adult: Body mass index is 26.58 kg/m .  Alert, no acute distress, oriented  HENT: throat/mouth:normal, good dentition  Lungs: no respiratory distress, or pursed lip breathing  Heart: No obvious jugular venous distension present  Abdomen: soft, nontender, no organomegaly or masses  Musculoskeltal: extremities normal, no peripheral edema  Skin: no suspicious lesions or rashes  Neuro: Alert, oriented, speech and mentation normal  Psych: affect and mood normal  Gait: Normal  Rectal: 25cc gland, no nodules or induration " (11/1/18)      PSA  9/12/18     0.50  2/5/18       0.54  5/31/17     0.43      ASSESSMENT and PLAN  58-year-old man with history of LUTS and erectile dysfunction    LUTS  -We reviewed the pathophysiology of the bladder and the prostate and the normal aging process associated with the development of BPH and LUTS.  -We reviewed that first-line treatment is tamsulosin 0.4mg (Flomax), we discussed the risks and benefits and common side effects of this medication and prescription was sent to his pharmacy  -Given the large component of OAB (over-active bladder) symptoms we discussed that the addition of a anticholinergic may be warranted, however we discussed the difficulty in initiation of 2 medications at one time.  -We will plan for follow-up in 3 months for symptom review.  If he continues to have issues with overactive bladder at that time we will plan for initiation of anticholinergic    Erectile dysfunction   -He has been doing well with sildenafil 20 mg as needed  -Prescription for this refilled      I spent over 15 minutes with the patient.  Over half this time was spent on counseling regarding the above.    Eliazar Ashraf MD   Urology  [facility]  Clinic Phone [phone]

## 2020-02-23 ENCOUNTER — HEALTH MAINTENANCE LETTER (OUTPATIENT)
Age: 60
End: 2020-02-23

## 2020-03-10 ENCOUNTER — OFFICE VISIT (OUTPATIENT)
Dept: UROLOGY | Facility: CLINIC | Age: 60
End: 2020-03-10
Payer: COMMERCIAL

## 2020-03-10 VITALS — DIASTOLIC BLOOD PRESSURE: 83 MMHG | OXYGEN SATURATION: 98 % | SYSTOLIC BLOOD PRESSURE: 130 MMHG | HEART RATE: 84 BPM

## 2020-03-10 DIAGNOSIS — R39.9 LOWER URINARY TRACT SYMPTOMS (LUTS): Primary | ICD-10-CM

## 2020-03-10 DIAGNOSIS — Z12.5 SCREENING PSA (PROSTATE SPECIFIC ANTIGEN): ICD-10-CM

## 2020-03-10 DIAGNOSIS — E78.5 HYPERLIPIDEMIA LDL GOAL <130: ICD-10-CM

## 2020-03-10 LAB — PSA SERPL-MCNC: 0.6 UG/L (ref 0–4)

## 2020-03-10 PROCEDURE — 99213 OFFICE O/P EST LOW 20 MIN: CPT | Performed by: UROLOGY

## 2020-03-10 PROCEDURE — 84153 ASSAY OF PSA TOTAL: CPT | Performed by: UROLOGY

## 2020-03-10 PROCEDURE — 36415 COLL VENOUS BLD VENIPUNCTURE: CPT | Performed by: UROLOGY

## 2020-03-10 NOTE — TELEPHONE ENCOUNTER
"Requested Prescriptions   Pending Prescriptions Disp Refills     pravastatin (PRAVACHOL) 40 MG tablet  Last Written Prescription Date:  6/10/19  Last Fill Quantity: 90 tablet,  # refills: 0   Last office visit: 9/4/2019 with prescribing provider:  Dr. Burleson   Future Office Visit:   Next 5 appointments (look out 90 days)    Mar 10, 2020 12:30 PM CDT  Return Visit with Eliazar Ashraf MD  [facility] ([facility] [address] [place] [id number] [phone]          90 tablet 0     Sig: Take 1 tablet (40 mg) by mouth daily       Statins Protocol Failed - 3/10/2020  8:41 AM        Failed - Recent (12 mo) or future (30 days) visit within the authorizing provider's specialty     Patient has had an office visit with the authorizing provider or a provider within the authorizing providers department within the previous 12 mos or has a future within next 30 days. See \"Patient Info\" tab in inbasket, or \"Choose Columns\" in Meds & Orders section of the refill encounter.              Passed - LDL on file in past 12 months     Recent Labs   Lab Test 04/24/19  0915   LDL 95             Passed - No abnormal creatine kinase in past 12 months     No lab results found.             Passed - Medication is active on med list        Passed - Patient is age 18 or older             "

## 2020-03-10 NOTE — PROGRESS NOTES
"[facility]   CHIEF COMPLAINT   It was my pleasure to see Yovani Grayson who is a 59 year old male for follow-up of LUTS and nocturia.      HPI   Yovani Grayson is a very pleasant 59 year old male    Initially seen 11/1/18:  \"He notes that nocturia has been happening 2-3 times per night for several years. 1 month ago he developed worsening of frequency and urgency. This persisted for about 2 weeks and since mostly resolved. He continues to have some urgency and is conscious of PO fluid intake and timing of voids. Overall not too bothered by this.      He has Erectile dysfunction and uses sildenafil with good response.      He was also having bilateral hip pain and stopped taking his Statin and this pain has greatly improved.      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     AUASS: 1-3-1-4-2-1-3 = 15  QOL: 2\"     12/3/19:  At last visit he was counseled on treatment options and tamsulosin 0.4mg (Flomax) and life style modifications were discussed. He did not want to start on tamsulosin 0.4mg (Flomax).     He has been good about cutting out PO fluid intake about 2-3 hours prior to bed.   His symptoms have worsened with worsening of flow and frequency.     AUASS: 1-5-3-5-2-2-5 = 23  QOL = 4  PVR = 66cc     TODAY:  Doing well on tamsulosin 0.4mg (Flomax)   He is very happy with his response to tamsulosin and has no other concerns today    AUASS: 5-8-4-1-0-0-3 = 7  QOL = 1    PHYSICAL EXAM  Patient is a 59 year old  male   Vitals: Blood pressure 130/83, pulse 84, SpO2 98 %.  General Appearance Adult: There is no height or weight on file to calculate BMI.  Alert, no acute distress, oriented  HENT: throat/mouth:normal, good dentition  Lungs: no respiratory distress, or pursed lip breathing  Heart: No obvious jugular venous distension present  Abdomen: soft, nontender, no organomegaly or masses  Musculoskeltal: extremities normal, no peripheral edema  Skin: no suspicious lesions or " samantha  Neuro: Alert, oriented, speech and mentation normal  Psych: affect and mood normal  Gait: Normal  Rectal: 25cc gland, no nodules or induration (11/1/18)    PSA   Date Value Ref Range Status   03/10/2020 0.60 0 - 4 ug/L Final     Comment:     Assay Method:  Chemiluminescence using Siemens Vista analyzer      PSA  9/12/18     0.50  2/5/18       0.54  5/31/17     0.43      ASSESSMENT and PLAN  59-year-old man with history of LUTS and erectile dysfunction    LUTS  -He is doing well and has had an excellent response with tamsulosin 0.4 mg daily  -I advised that he continue on this medication daily  -He had previously also been having some overactive bladder symptoms, however these have resolved with improvement of his bladder outlet and so no additional medical management is needed at this time  -Follow-up with me in 1 year  -We also reviewed that his PSA is normal    Erectile dysfunction   -He has been doing well with sildenafil 20 mg as needed  -Prescription current      I spent over 15 minutes with the patient.  Over half this time was spent on counseling regarding the above.    Eliazar Ashraf MD   Urology  [facility]  Clinic Phone [phone]

## 2020-03-12 RX ORDER — PRAVASTATIN SODIUM 40 MG
40 TABLET ORAL DAILY
Qty: 90 TABLET | Refills: 0 | Status: SHIPPED | OUTPATIENT
Start: 2020-03-12 | End: 2020-06-10

## 2020-03-12 NOTE — TELEPHONE ENCOUNTER
Given 3 month prescription.  Due for follow up with primary care provider of his choosing prior to additional medications.  Please assist with scheduling

## 2020-03-12 NOTE — TELEPHONE ENCOUNTER
Routing refill request to provider for review/approval because:  A break in medication    Nimo Ascencio RN  [facility]

## 2020-06-09 ENCOUNTER — TELEPHONE (OUTPATIENT)
Dept: FAMILY MEDICINE | Facility: CLINIC | Age: 60
End: 2020-06-09

## 2020-06-09 DIAGNOSIS — E78.5 HYPERLIPIDEMIA LDL GOAL <130: ICD-10-CM

## 2020-06-09 NOTE — LETTER
[address]  [address]  [phone]    Jennifer 10, 2020      Yovani Grayson  [address] [place] [id number]      Dear Yovani,    We have refilled your pravastatin for 1 month   We will need to see you for a virtual visit before any additional refills can be given.  Please call [phone] to schedule this appointment.      Thank you,    [facility]

## 2020-06-10 RX ORDER — PRAVASTATIN SODIUM 40 MG
40 TABLET ORAL DAILY
Qty: 30 TABLET | Refills: 0 | Status: SHIPPED | OUTPATIENT
Start: 2020-06-10 | End: 2020-06-15 | Stop reason: DRUGHIGH

## 2020-06-10 NOTE — TELEPHONE ENCOUNTER
Routing refill request to provider for review/approval because:  Labs not current:  LDL    Teresita Matos RN, [facility] Triage

## 2020-06-10 NOTE — TELEPHONE ENCOUNTER
Given one month- please assist with scheduling virtual visit with whomever he considers his primary

## 2020-06-10 NOTE — TELEPHONE ENCOUNTER
Reminder letter to schedule needed appt for further refills mailed to pt's home address.      Kimberly WICK (R))

## 2020-06-15 ENCOUNTER — VIRTUAL VISIT (OUTPATIENT)
Dept: FAMILY MEDICINE | Facility: CLINIC | Age: 60
End: 2020-06-15
Payer: COMMERCIAL

## 2020-06-15 DIAGNOSIS — G47.00 INSOMNIA, UNSPECIFIED TYPE: Primary | ICD-10-CM

## 2020-06-15 DIAGNOSIS — E78.5 HYPERLIPIDEMIA LDL GOAL <130: ICD-10-CM

## 2020-06-15 PROCEDURE — 99214 OFFICE O/P EST MOD 30 MIN: CPT | Mod: TEL | Performed by: NURSE PRACTITIONER

## 2020-06-15 RX ORDER — PRAVASTATIN SODIUM 20 MG
20 TABLET ORAL DAILY
Qty: 90 TABLET | Refills: 1 | Status: SHIPPED | OUTPATIENT
Start: 2020-06-15 | End: 2020-09-15

## 2020-06-15 RX ORDER — ZOLPIDEM TARTRATE 5 MG/1
5 TABLET ORAL
Qty: 30 TABLET | Refills: 0 | Status: SHIPPED | OUTPATIENT
Start: 2020-06-15 | End: 2020-08-06

## 2020-06-15 NOTE — PROGRESS NOTES
"Yovani Grayson is a 59 year old male who is being evaluated via a billable telephone visit.      The patient has been notified of following:     \"This telephone visit will be conducted via a call between you and your physician/provider. We have found that certain health care needs can be provided without the need for a physical exam.  This service lets us provide the care you need with a short phone conversation.  If a prescription is necessary we can send it directly to your pharmacy.  If lab work is needed we can place an order for that and you can then stop by our lab to have the test done at a later time.    Telephone visits are billed at different rates depending on your insurance coverage. During this emergency period, for some insurers they may be billed the same as an in-person visit.  Please reach out to your insurance provider with any questions.    If during the course of the call the physician/provider feels a telephone visit is not appropriate, you will not be charged for this service.\"    Patient has given verbal consent for Telephone visit?  Yes    What phone number would you like to be contacted at? [phone]    How would you like to obtain your AVS? [place]     Yovani Grayson is a 59 year old male who presents via phone visit today for the following health issues:    HPI  Hyperlipidemia Follow-Up      Are you regularly taking any medication or supplement to lower your cholesterol?   Yes- Pravastatin    Are you having muscle aches or other side effects that you think could be caused by your cholesterol lowering medication?  Yes- restless legs    How many servings of fruits and vegetables do you eat daily?  2-3    On average, how many sweetened beverages do you drink each day (Examples: soda, juice, sweet tea, etc.  Do NOT count diet or artificially sweetened beverages)?   0    How many days per week do you exercise enough to make your heart beat faster? 3 or less    How many " "minutes a day do you exercise enough to make your heart beat faster? 60 or more    How many days per week do you miss taking your medication? 0      He reports he has been taking 20 mg of pravastatin for the past year. When he was taking 40 mg he was having side effects. He doesn't need a refill at this time. He has 2 months script left to take at 20 mg daily.        Has issue with his hip. Takes ibuprofen when it gets bad. Saw a specialist int he past for it. He was told someday 10 years down the road he may need hip replacement. He tries to walk 4 miles a day. Hasn't done PT yet for his hip. He doesn't want PT at this time. He states he knows what he needs to do to work on strength.       Also reports he used to be a good sleeper and now he goes stretches where he cannot sleep for multiple days in a row, then with that he gets the restless legs. He tried OTC sleep aids, they help a little. He feels like he cannot \"turn my brain off\". Once he is asleep he can stay asleep.       Patient Active Problem List   Diagnosis     Primary osteoarthritis of left hip     Cervical disc syndrome     Hyperlipidemia LDL goal <130     Other male erectile dysfunction     Testicular hypofunction     Tension headache     Past Surgical History:   Procedure Laterality Date     HERNIORRHAPHY UMBILICAL         Social History     Tobacco Use     Smoking status: Never Smoker     Smokeless tobacco: Never Used   Substance Use Topics     Alcohol use: No     Family History   Problem Relation Age of Onset     Hypertension Father          age 59 lung cancer      Lung Cancer Father      Breast Cancer Mother      Diabetes No family hx of      Colon Cancer No family hx of      Coronary Artery Disease No family hx of      Skin Cancer No family hx of          Current Outpatient Medications   Medication Sig Dispense Refill     ibuprofen (ADVIL/MOTRIN) 200 MG tablet Take 200 mg by mouth every 4 hours as needed for mild pain       pravastatin " (PRAVACHOL) 40 MG tablet Take 1 tablet (40 mg) by mouth daily +++NO FURTHER REFILLS without FOLLOW UP+++ 30 tablet 0     sildenafil (REVATIO) 20 MG tablet Take 1 tablet (20 mg) by mouth as needed (for intercourse) 10 tablet 11     tamsulosin (FLOMAX) 0.4 MG capsule Take 1 capsule (0.4 mg) by mouth daily 90 capsule 3     methocarbamol (ROBAXIN) 500 MG tablet Take 1-2 tablets (500-1,000 mg) by mouth 4 times daily as needed for muscle spasms (Patient not taking: Reported on 11/13/2019) 60 tablet 1     nortriptyline (PAMELOR) 10 MG capsule Take 1 capsule (10 mg) by mouth See Admin Instructions One po q hs for one week, then two po q hs. (Patient not taking: Reported on 6/15/2020) 60 capsule 1     triamcinolone (KENALOG) 0.1 % external ointment Apply topically 2 times daily (Patient not taking: Reported on 9/4/2019) 60 g 1     No Known Allergies    Reviewed and updated as needed this visit by Provider         Review of Systems   Constitutional, HEENT, cardiovascular, pulmonary, gi and gu systems are negative, except as otherwise noted.       Objective   Reported vitals:  There were no vitals taken for this visit.   healthy, alert and no distress  PSYCH: Alert and oriented times 3; coherent speech, normal   rate and volume, able to articulate logical thoughts, able   to abstract reason, no tangential thoughts, no hallucinations   or delusions  His affect is normal  RESP: No cough, no audible wheezing, able to talk in full sentences  Remainder of exam unable to be completed due to telephone visits    Diagnostic Test Results:  Labs reviewed in Epic        Assessment/Plan:  1. Hyperlipidemia LDL goal <130  Will come in for fasting labs, he has only been taking 20 mg for the past year. Will make adjustments if cholesterol is high  - Lipid panel reflex to direct LDL Fasting; Future  - **Comprehensive metabolic panel FUTURE anytime; Future  - pravastatin (PRAVACHOL) 20 MG tablet; Take 1 tablet (20 mg) by mouth daily  Dispense:  90 tablet; Refill: 1    2. Insomnia, unspecified type  Having trouble sleeping from time to time, advised if this is working he can call for refill. But if he feels he needs higher dose then he needs an appointment for that. Admits he will not use this on a daily basis  - zolpidem (AMBIEN) 5 MG tablet; Take 1 tablet (5 mg) by mouth nightly as needed for sleep  Dispense: 30 tablet; Refill: 0    No follow-ups on file.      Phone call duration:  12 minutes    COURTNEY Felipe, NP-C  [facility]

## 2020-06-15 NOTE — PATIENT INSTRUCTIONS
At [facility], we strive to deliver an exceptional experience to you, every time we see you. If you receive a survey, please complete it as we do value your feedback.  If you have MyChart, you can expect to receive results automatically within 24 hours of their completion.  Your provider will send a note interpreting your results as well.   If you do not have MyChart, you should receive your results in about a week by mail.    Your care team:     Family Medicine   PATY Thomason APRN CNP S. Matthew Hockett, MD Pamela Kolacz, MD Angela Wermerskirchen, MD    Internal Medicine  Derek Alcocer MD     Clinic hours: Monday - Wednesday 7 am-7 pm   Thursdays and Fridays 7 am-5 pm.     [facility]: Monday - Friday 11 am-9 pm,   Saturday and Sunday 9 am-5 pm.     [facility]: Monday - Thursday 8 am - 7 pm; Friday 8 am - 6 pm    Clinic: [phone]   [facility]: [phone]     Use [email or web address] for 24/7 diagnosis and treatment of dozens of conditions.

## 2020-06-24 ENCOUNTER — TELEPHONE (OUTPATIENT)
Dept: FAMILY MEDICINE | Facility: CLINIC | Age: 60
End: 2020-06-24

## 2020-06-24 NOTE — TELEPHONE ENCOUNTER
This writer attempted to contact patient on 06/24/20      Reason for call Update Depression screening and left message.      If patient calls back:   [facility] Team        Angy Carbajal MA

## 2020-08-05 DIAGNOSIS — G47.00 INSOMNIA, UNSPECIFIED TYPE: ICD-10-CM

## 2020-08-05 NOTE — TELEPHONE ENCOUNTER
Requested Prescriptions   Pending Prescriptions Disp Refills     zolpidem (AMBIEN) 5 MG tablet 30 tablet 0     Sig: Take 1 tablet (5 mg) by mouth nightly as needed for sleep       There is no refill protocol information for this order        Routing refill request to provider for review/approval because:  Drug not on [place] [facility] [place]     Cady Gonzalez RN, BSN, PHN

## 2020-08-06 RX ORDER — ZOLPIDEM TARTRATE 5 MG/1
5 TABLET ORAL
Qty: 30 TABLET | Refills: 1 | Status: SHIPPED | OUTPATIENT
Start: 2020-08-06 | End: 2020-10-16

## 2020-09-09 ENCOUNTER — VIRTUAL VISIT (OUTPATIENT)
Dept: DERMATOLOGY | Facility: CLINIC | Age: 60
End: 2020-09-09
Payer: COMMERCIAL

## 2020-09-09 DIAGNOSIS — L57.0 AK (ACTINIC KERATOSIS): ICD-10-CM

## 2020-09-09 DIAGNOSIS — L82.1 SEBORRHEIC KERATOSIS: Primary | ICD-10-CM

## 2020-09-09 PROCEDURE — 99213 OFFICE O/P EST LOW 20 MIN: CPT | Mod: TEL | Performed by: DERMATOLOGY

## 2020-09-09 ASSESSMENT — PAIN SCALES - GENERAL: PAINLEVEL: NO PAIN (0)

## 2020-09-09 NOTE — LETTER
"    9/9/2020         RE: Yovani Grayson  [address]   [place] [address]        Dear Colleague,    Thank you for referring your patient, Yovani Grayson, to the [facility]. Please see a copy of my visit note below.    [facility] Dermatology Record:  Store and Forward and Telephone       Dermatology Problem List:  1.Hx of AKs  - treated with efudex cream around 2015, plan to repeat winter 2020  - s/p cryotherapy 3/13/2019   2. Irritated SK, left cheek: plan for cryo    Encounter Date: Sep 9, 2020    CC:   Chief Complaint   Patient presents with     Derm Problem     Lesion      1. Left cheek lesion - he's had it for 6 months now. The lesion never seems to heal, \"always raw\". A while back, it did bled a bit but no longer does. Slightly raised. Feels scaly. Slight pain, irritated area. Denies itching. He has tried to shave less and the area still not resolved. No changes in shape, size or color. No current treatment to the area.      History of Present Illness:  Yovani Grayson is a 59 year old male who presents for spot of concern on the left cheek.    Spot is on the left cheek.  Present 6-12 months.  Bled once. He is unsure if this was caused by shaving as he shaves over it daily.  Took a break from shaving for about one month and did not see any improvement in lesion.  No pain with pushing but skin feels irritated around it.    Last use of efudex cream was in 2015. Did not enjoy the process but was very happy with results. Notes that now would be a good time to retreat as he is not seeing many other people due to the pandemic and is fully retired.        ROS: Patient is generally feeling well today    Physical Examination:  General: Well-appearing male, appropriately-developed individual.  Skin: Focused examination including face was performed.   -Skin colored waxy papule that appears stuck on the left preauricular cheek. This is occurring in an area of sun damage with noteable " gritty pink papules throughout.    Labs:  None    Past Medical History:   Patient Active Problem List   Diagnosis     Primary osteoarthritis of left hip     Cervical disc syndrome     Hyperlipidemia LDL goal <130     Other male erectile dysfunction     Testicular hypofunction     Tension headache     Past Medical History:   Diagnosis Date     Erectile dysfunction      Past Surgical History:   Procedure Laterality Date     HERNIORRHAPHY UMBILICAL         Social History:  Patient reports that he has never smoked. He has never used smokeless tobacco. He reports that he does not drink alcohol or use drugs. Patient is a retired .     Family History:  Family History   Problem Relation Age of Onset     Hypertension Father          age 59 lung cancer      Lung Cancer Father      Breast Cancer Mother      Diabetes No family hx of      Colon Cancer No family hx of      Coronary Artery Disease No family hx of      Skin Cancer No family hx of    Reviewed and left in chart for clinician convenience.       Medications:  Current Outpatient Medications   Medication     ibuprofen (ADVIL/MOTRIN) 200 MG tablet     methocarbamol (ROBAXIN) 500 MG tablet     pravastatin (PRAVACHOL) 20 MG tablet     sildenafil (REVATIO) 20 MG tablet     tamsulosin (FLOMAX) 0.4 MG capsule     triamcinolone (KENALOG) 0.1 % external ointment     zolpidem (AMBIEN) 5 MG tablet     No current facility-administered medications for this visit.           No Known Allergies        Impression and Recommendations (Patient Counseled on the Following):  1. Sk in background of AKs: The SK is getting chronically irritated from shaving, warranting further treatment. Will schedule in person visit to freeze in the next 1-2 months. The SK is occurring in a background of AKs, so field treatment is again warranted. Good response to efudex cream in the past, so I would again go with this. I will have patient wait until after we freeze the area on  left  "cheek to begin. I will assess in person for what areas of the face need treatment.       Follow-up:   1-2 months in person to freeze SK     Staff only    Juani Mayo MD    Department of Dermatology  [facility]: Phone: [phone], Fax:[phone]  [facility]: Phone: [phone], Fax: [phone]    _____________________________________________________________________________    Teledermatology information:  - Location of patient: [place]  - Patient presented as: return  - Location of teledermatologist:  ([facility] )  - Reason teledermatology is appropriate:  of National Emergency Regarding Coronavirus disease (COVID 19) Outbreak  - Image quality and interpretability: acceptable  - Physician has received verbal consent for a Video/Photos Visit from the patient? Yes  - In-person dermatology visit recommendation: yes - for physician visit  - Date of images: 9/5/20  - Service start time: 9:54  - Service end time: 10:01  - Date of report: 9/9/2020         Teledermatology Nurse Call for RETURN patients seen within the last 3 years:      The patient was contacted by phone and we reviewed they have a visit in teledermatology upcoming with an MD or PATY;  Importantly, \"a teledermatology visit may not be as thorough as an in-person visit, and the quality of the photograph and/or video sent may not be of the same quality as that taken by the dermatology clinic.\"     We have found that certain health care needs can be provided without the need for an in-person physical exam.   If a prescription is necessary we can send it directly to your pharmacy.  If lab work is needed we can place an order for that and you can then stop by our lab to have the test done at a later time.Visits are billed at different rates depending on your insurance coverage. Please reach out to your " insurance provider with any questions.    The patient chose to:                                                                                                                                                                                                                 Consent to a teledermatology visit with [employer] photos. Patient told by nursing these are already uploaded. Instructions sent to patient via [employer]. They verified they will be in the state of [place] at the time of the encounter.                                                                                                                                                                                                                                     The patient denied skin pain, fever, mucosal symptoms(lesions, blisters, sores in the mouth, nose, eyes, or genitals)  IF PATIENT ENDORSES ANY OF THESE STOP AND PAGE ON CALL ATTENDING                                                                                                                                                                                                                                 Again, thank you for allowing me to participate in the care of your patient.        Sincerely,        Juani Mayo MD

## 2020-09-09 NOTE — Clinical Note
Please schedule an in person appt with me anytime in the next 2 months. It is to freeze an SK on the left cheek and discuss efudex cream for AKs. Ok to schedule in a 15 min appt slot. If you have trouble finding a spot, send to [facility] who can usually find something.     Thanks,  LF

## 2020-09-09 NOTE — PATIENT INSTRUCTIONS
[facility] Dermatology Visit    Thank you for allowing us to participate in your care.    In person visit to freeze seborrheic keratosis on the left cheek.  We;ll take a look at the rest of the face and come up with plan for the efudex cream then too.    When should I call my doctor?    If you are worsening or not improving, please, contact us or seek urgent care as noted below.     Who should I call with questions (adults)?    [facility] (adult and pediatric): [phone]     [facility] (adult): [phone]    For urgent needs outside of business hours call the [facility] at [phone] and ask for the dermatology resident on call    If this is a medical emergency and you are unable to reach an ER, Call 311      Who should I call with questions (pediatric)?  [facility]- Pediatric Dermatology  Dr. Maryam Allen, Dr. Royal Ybarra, Dr. Lesly Torrez, Kathy Stahl, PA  Dr. Yenny Sandoval, Dr. Doreen Islas & Dr. Triston Hartmann  Non Urgent  Nurse Triage Line; [phone]- Jena and Bonita BROWN Care Coordinators   [place] (/Complex ) [phone]    If you need a prescription refill, please contact your pharmacy. Refills are approved or denied by our Physicians during normal business hours, Monday through Fridays  Per office policy, refills will not be granted if you have not been seen within the past year (or sooner depending on your child's condition)    Scheduling Information:  Pediatric Appointment Scheduling and Call Center [phone]  Radiology Scheduling- [phone]  Sedation Unit Scheduling- [phone]  Surveyor Scheduling- General [phone]; Pediatric Dermatology [phone]  Main  Services: [phone]  Arabic: [phone]  [place]er: [phone]  Hmong/Adolph/Serbian: [phone]  Preadmission Nursing Department Fax Number: [phone]  (Fax all pre-operative paperwork to this number)    For urgent matters arising during evenings, weekends, or holidays that cannot wait for normal business hours please call [phone] and ask for the Dermatology Resident On-Call to be paged.

## 2020-09-09 NOTE — PROGRESS NOTES
" [facility] Dermatology Record:  Store and Forward and Telephone       Dermatology Problem List:  1.Hx of AKs  - treated with efudex cream around 2015, plan to repeat winter 2020  - s/p cryotherapy 3/13/2019   2. Irritated SK, left cheek: plan for cryo    Encounter Date: Sep 9, 2020    CC:   Chief Complaint   Patient presents with     Derm Problem     Lesion      1. Left cheek lesion - he's had it for 6 months now. The lesion never seems to heal, \"always raw\". A while back, it did bled a bit but no longer does. Slightly raised. Feels scaly. Slight pain, irritated area. Denies itching. He has tried to shave less and the area still not resolved. No changes in shape, size or color. No current treatment to the area.      History of Present Illness:  Yovani Grayson is a 59 year old male who presents for spot of concern on the left cheek.    Spot is on the left cheek.  Present 6-12 months.  Bled once. He is unsure if this was caused by shaving as he shaves over it daily.  Took a break from shaving for about one month and did not see any improvement in lesion.  No pain with pushing but skin feels irritated around it.    Last use of efudex cream was in 2015. Did not enjoy the process but was very happy with results. Notes that now would be a good time to retreat as he is not seeing many other people due to the pandemic and is fully retired.        ROS: Patient is generally feeling well today    Physical Examination:  General: Well-appearing male, appropriately-developed individual.  Skin: Focused examination including face was performed.   -Skin colored waxy papule that appears stuck on the left preauricular cheek. This is occurring in an area of sun damage with noteable gritty pink papules throughout.    Labs:  None    Past Medical History:   Patient Active Problem List   Diagnosis     Primary osteoarthritis of left hip     Cervical disc syndrome     Hyperlipidemia LDL goal <130     Other male erectile " dysfunction     Testicular hypofunction     Tension headache     Past Medical History:   Diagnosis Date     Erectile dysfunction      Past Surgical History:   Procedure Laterality Date     HERNIORRHAPHY UMBILICAL         Social History:  Patient reports that he has never smoked. He has never used smokeless tobacco. He reports that he does not drink alcohol or use drugs. Patient is a retired .     Family History:  Family History   Problem Relation Age of Onset     Hypertension Father          age 59 lung cancer      Lung Cancer Father      Breast Cancer Mother      Diabetes No family hx of      Colon Cancer No family hx of      Coronary Artery Disease No family hx of      Skin Cancer No family hx of    Reviewed and left in chart for clinician convenience.       Medications:  Current Outpatient Medications   Medication     ibuprofen (ADVIL/MOTRIN) 200 MG tablet     methocarbamol (ROBAXIN) 500 MG tablet     pravastatin (PRAVACHOL) 20 MG tablet     sildenafil (REVATIO) 20 MG tablet     tamsulosin (FLOMAX) 0.4 MG capsule     triamcinolone (KENALOG) 0.1 % external ointment     zolpidem (AMBIEN) 5 MG tablet     No current facility-administered medications for this visit.           No Known Allergies        Impression and Recommendations (Patient Counseled on the Following):  1. Sk in background of AKs: The SK is getting chronically irritated from shaving, warranting further treatment. Will schedule in person visit to freeze in the next 1-2 months. The SK is occurring in a background of AKs, so field treatment is again warranted. Good response to efudex cream in the past, so I would again go with this. I will have patient wait until after we freeze the area on th left cheek to begin. I will assess in person for what areas of the face need treatment.       Follow-up:   1-2 months in person to freeze SK     Staff only    Juani Mayo MD    Department of Dermatology  [facility]  "[facility]: Phone: [phone], Fax:[phone]  [facility]: Phone: [phone], Fax: [phone]    _____________________________________________________________________________    Teledermatology information:  - Location of patient: [place]  - Patient presented as: return  - Location of teledermatologist:  ([facility] )  - Reason teledermatology is appropriate:  of National Emergency Regarding Coronavirus disease (COVID 19) Outbreak  - Image quality and interpretability: acceptable  - Physician has received verbal consent for a Video/Photos Visit from the patient? Yes  - In-person dermatology visit recommendation: yes - for physician visit  - Date of images: 9/5/20  - Service start time: 9:54  - Service end time: 10:01  - Date of report: 9/9/2020         Teledermatology Nurse Call for RETURN patients seen within the last 3 years:      The patient was contacted by phone and we reviewed they have a visit in teledermatology upcoming with an MD or PA-C;  Importantly, \"a teledermatology visit may not be as thorough as an in-person visit, and the quality of the photograph and/or video sent may not be of the same quality as that taken by the dermatology clinic.\"     We have found that certain health care needs can be provided without the need for an in-person physical exam.   If a prescription is necessary we can send it directly to your pharmacy.  If lab work is needed we can place an order for that and you can then stop by our lab to have the test done at a later time.Visits are billed at different rates depending on your insurance coverage. Please reach out to your insurance provider with any questions.    The patient chose to:                                                                                                                                                                                    "                              Consent to a teledermatology visit with [employer] photos. Patient told by nursing these are already uploaded. Instructions sent to patient via [employer] They verified they will be in the state of [place] at the time of the encounter.                                                                                                                                                                                                                                     The patient denied skin pain, fever, mucosal symptoms(lesions, blisters, sores in the mouth, nose, eyes, or genitals)  IF PATIENT ENDORSES ANY OF THESE STOP AND PAGE ON CALL ATTENDING

## 2020-09-10 ENCOUNTER — TELEPHONE (OUTPATIENT)
Dept: DERMATOLOGY | Facility: CLINIC | Age: 60
End: 2020-09-10

## 2020-09-10 DIAGNOSIS — E78.5 HYPERLIPIDEMIA LDL GOAL <130: ICD-10-CM

## 2020-09-10 LAB
ALBUMIN SERPL-MCNC: 3.7 G/DL (ref 3.4–5)
ALP SERPL-CCNC: 62 U/L (ref 40–150)
ALT SERPL W P-5'-P-CCNC: 25 U/L (ref 0–70)
ANION GAP SERPL CALCULATED.3IONS-SCNC: 8 MMOL/L (ref 3–14)
AST SERPL W P-5'-P-CCNC: 22 U/L (ref 0–45)
BILIRUB SERPL-MCNC: 0.7 MG/DL (ref 0.2–1.3)
BUN SERPL-MCNC: 13 MG/DL (ref 7–30)
CALCIUM SERPL-MCNC: 8.8 MG/DL (ref 8.5–10.1)
CHLORIDE SERPL-SCNC: 108 MMOL/L (ref 94–109)
CHOLEST SERPL-MCNC: 222 MG/DL
CO2 SERPL-SCNC: 23 MMOL/L (ref 20–32)
CREAT SERPL-MCNC: 1.06 MG/DL (ref 0.66–1.25)
GFR SERPL CREATININE-BSD FRML MDRD: 76 ML/MIN/{1.73_M2}
GLUCOSE SERPL-MCNC: 94 MG/DL (ref 70–99)
HDLC SERPL-MCNC: 55 MG/DL
LDLC SERPL CALC-MCNC: 129 MG/DL
NONHDLC SERPL-MCNC: 167 MG/DL
POTASSIUM SERPL-SCNC: 4.2 MMOL/L (ref 3.4–5.3)
PROT SERPL-MCNC: 7.2 G/DL (ref 6.8–8.8)
SODIUM SERPL-SCNC: 139 MMOL/L (ref 133–144)
TRIGL SERPL-MCNC: 192 MG/DL

## 2020-09-10 PROCEDURE — 80053 COMPREHEN METABOLIC PANEL: CPT | Performed by: NURSE PRACTITIONER

## 2020-09-10 PROCEDURE — 80061 LIPID PANEL: CPT | Performed by: NURSE PRACTITIONER

## 2020-09-10 PROCEDURE — 36415 COLL VENOUS BLD VENIPUNCTURE: CPT | Performed by: NURSE PRACTITIONER

## 2020-09-10 NOTE — TELEPHONE ENCOUNTER
Left message regarding note below. Okay to add at 315PM or 330PM spots on Tues or Thursday.  SOLEDAD Emerson Lori, MD  [facility] Dermatology Adult [place]               Please schedule an in person appt with me anytime in the next 2 months. It is to freeze an SK on the left cheek and discuss efudex cream for AKs. Ok to schedule in a 15 min appt slot. If you have trouble finding a spot, send to [facility] who can usually find something.     Thanks,   LF

## 2020-09-11 NOTE — RESULT ENCOUNTER NOTE
Tyrese Pina,  I know you mentioned you are taking your medication at half dose, 20 mg daily.  This shows that your cholesterol and things are higher than it was a year ago.  Are you interested in trialing a different medication?  Also I know you mentioned that when you taking the Pravachol at 40 mg daily you had side effects.  Keep me posted.  Thank you,  COURTNEY Felipe, NP-C  [facility]

## 2020-09-15 ENCOUNTER — OFFICE VISIT (OUTPATIENT)
Dept: PEDIATRICS | Facility: CLINIC | Age: 60
End: 2020-09-15
Payer: COMMERCIAL

## 2020-09-15 ENCOUNTER — ANCILLARY PROCEDURE (OUTPATIENT)
Dept: GENERAL RADIOLOGY | Facility: CLINIC | Age: 60
End: 2020-09-15
Attending: INTERNAL MEDICINE
Payer: COMMERCIAL

## 2020-09-15 VITALS
HEART RATE: 73 BPM | HEIGHT: 74 IN | TEMPERATURE: 98.3 F | WEIGHT: 210.4 LBS | OXYGEN SATURATION: 99 % | SYSTOLIC BLOOD PRESSURE: 116 MMHG | DIASTOLIC BLOOD PRESSURE: 76 MMHG | BODY MASS INDEX: 27 KG/M2

## 2020-09-15 DIAGNOSIS — Z23 NEED FOR PROPHYLACTIC VACCINATION AND INOCULATION AGAINST INFLUENZA: ICD-10-CM

## 2020-09-15 DIAGNOSIS — R07.9 LEFT-SIDED CHEST PAIN: Primary | ICD-10-CM

## 2020-09-15 PROBLEM — N40.1 BENIGN LOCALIZED PROSTATIC HYPERPLASIA WITH LOWER URINARY TRACT SYMPTOMS (LUTS): Status: ACTIVE | Noted: 2020-09-15

## 2020-09-15 PROBLEM — N52.03 COMBINED ARTERIAL INSUFFICIENCY AND CORPORO-VENOUS OCCLUSIVE ERECTILE DYSFUNCTION: Status: ACTIVE | Noted: 2020-09-15

## 2020-09-15 LAB
CRP SERPL-MCNC: <2.9 MG/L (ref 0–8)
ERYTHROCYTE [DISTWIDTH] IN BLOOD BY AUTOMATED COUNT: 13.1 % (ref 10–15)
ERYTHROCYTE [SEDIMENTATION RATE] IN BLOOD BY WESTERGREN METHOD: 7 MM/H (ref 0–20)
HCT VFR BLD AUTO: 46.8 % (ref 40–53)
HGB BLD-MCNC: 15.4 G/DL (ref 13.3–17.7)
MCH RBC QN AUTO: 29.7 PG (ref 26.5–33)
MCHC RBC AUTO-ENTMCNC: 32.9 G/DL (ref 31.5–36.5)
MCV RBC AUTO: 90 FL (ref 78–100)
PLATELET # BLD AUTO: 213 10E9/L (ref 150–450)
RBC # BLD AUTO: 5.18 10E12/L (ref 4.4–5.9)
WBC # BLD AUTO: 5.8 10E9/L (ref 4–11)

## 2020-09-15 PROCEDURE — 71046 X-RAY EXAM CHEST 2 VIEWS: CPT

## 2020-09-15 PROCEDURE — 90471 IMMUNIZATION ADMIN: CPT | Performed by: INTERNAL MEDICINE

## 2020-09-15 PROCEDURE — 90682 RIV4 VACC RECOMBINANT DNA IM: CPT | Performed by: INTERNAL MEDICINE

## 2020-09-15 PROCEDURE — 85027 COMPLETE CBC AUTOMATED: CPT | Performed by: INTERNAL MEDICINE

## 2020-09-15 PROCEDURE — 85652 RBC SED RATE AUTOMATED: CPT | Performed by: INTERNAL MEDICINE

## 2020-09-15 PROCEDURE — 36415 COLL VENOUS BLD VENIPUNCTURE: CPT | Performed by: INTERNAL MEDICINE

## 2020-09-15 PROCEDURE — 99214 OFFICE O/P EST MOD 30 MIN: CPT | Mod: 25 | Performed by: INTERNAL MEDICINE

## 2020-09-15 PROCEDURE — 86140 C-REACTIVE PROTEIN: CPT | Performed by: INTERNAL MEDICINE

## 2020-09-15 ASSESSMENT — MIFFLIN-ST. JEOR: SCORE: 1826.18

## 2020-09-15 NOTE — PROGRESS NOTES
Subjective     Yovani Grayson is a 59 year old male who presents to clinic today for the following health issues:    HPI     59-year-old gentleman with history of hyperlipidemia presents with left-sided chest pain since June 2020.  The symptom is this scribed as aching, sharp from persistent when it starts lasting several hours.  Initially it was intermittent and not daily basis but for the past 1 month he has these episodes on a daily basis.  He still is intermittent and it starts when he gets up in the morning and as the day progresses it seems to get more frequent or worse.  He uses 5 mg Ambien for sleep and once he sleeps he has no chest pain .  He denies shortness of breath.  No palpitation, dizziness or lightheadedness.  He remains very active and walks about 4 miles a day.  Walking or exertion does not change the pain.    Musculoskeletal problem/pain  Onset/Duration: started in June 2020  Description:  Was replacing decks/very physical work then he noticed pain under his left breast,then radiates up to his left shoulder, pain seems to move to his stomach and sternum area. Periodically he has shortness of breathe, the pain has increased over the last month, the pain never really goes away.  Location: Under left breast,rib cage area  Joint Swelling: no  Redness: no  Pain: Yes 4/10  Warmth: no  Intensity:  4-5/10  Progression of Symptoms:  worsening and constant  Accompanying signs and symptoms:   Fevers: no  Numbness/tingling/weakness: no  History  Trauma to the area: no  Recent illness:  no  Previous similar problem: no  Previous evaluation:  no  Precipitating or alleviating factors:  Aggravating factors include: none  Therapies tried and outcome: Ibuprofen 800 mg and that seems to help.  Then pain goes away when he is lying down at night.  Sleeps through the night.  When he wakes up the pain is gone then the pain returns as the day goes on.    (Appt notes from patient)  I have had pain on my left side just  "below my rib cage. This has been persistent for several months and will vary in intensity. It also migrates at times but is mostly on the left side. It seems to be less in the morning but occasionally it is bad then as well      Review of Systems   Constitutional, HEENT, cardiovascular, pulmonary, GI, , musculoskeletal, neuro, skin, endocrine and psych systems are negative, except as otherwise noted.      Objective    There were no vitals taken for this visit.  There is no height or weight on file to calculate BMI.  Physical Exam   GENERAL: healthy, alert and no distress  NECK: no adenopathy, no asymmetry, masses, or scars and thyroid normal to palpation  RESP: lungs clear to auscultation - no rales, rhonchi or wheezes  CV: regular rate and rhythm, normal S1 S2, no S3 or S4, no murmur, click or rub, no peripheral edema and peripheral pulses strong. No chest wall tenderness  ABDOMEN: soft, nontender, no hepatosplenomegaly, no masses and bowel sounds normal  MS: no gross musculoskeletal defects noted, no edema    Lab on 9/10/2020 showed normal electrolytes and renal function.  Liver function studies normal.  Fasting blood sugar 94.  Cholesterol 222, HDL 55,  triglyceride 192        Assessment & Plan     1.  Left-sided chest pain x4 months.  To further investigate I recommend checking CBC with differential ESR and CRP along with chest x-ray.  I will review and if abnormal then I will recommend CT of the chest for further evaluation.  The pain does not appear to be cardiac in origin.  2.  Hyperlipidemia on pravastatin 40 mg a day.  Lipid profile performed recently as stated above.     BMI:   Estimated body mass index is 27.38 kg/m  as calculated from the following:    Height as of this encounter: 1.867 m (6' 1.5\").    Weight as of this encounter: 95.4 kg (210 lb 6.4 oz).         No follow-ups on file.    Arnold Cody MD  [facility]    "

## 2020-09-16 DIAGNOSIS — R07.9 LEFT-SIDED CHEST PAIN: Primary | ICD-10-CM

## 2020-09-17 ENCOUNTER — OFFICE VISIT (OUTPATIENT)
Dept: DERMATOLOGY | Facility: CLINIC | Age: 60
End: 2020-09-17
Payer: COMMERCIAL

## 2020-09-17 DIAGNOSIS — L57.0 ACTINIC KERATOSIS: Primary | ICD-10-CM

## 2020-09-17 DIAGNOSIS — L82.0 INFLAMED SEBORRHEIC KERATOSIS: ICD-10-CM

## 2020-09-17 PROCEDURE — 17000 DESTRUCT PREMALG LESION: CPT | Mod: 59 | Performed by: DERMATOLOGY

## 2020-09-17 PROCEDURE — 17110 DESTRUCTION B9 LES UP TO 14: CPT | Performed by: DERMATOLOGY

## 2020-09-17 RX ORDER — FLUOROURACIL 50 MG/G
CREAM TOPICAL
Qty: 40 G | Refills: 1 | Status: SHIPPED | OUTPATIENT
Start: 2020-09-17

## 2020-09-17 NOTE — PROGRESS NOTES
[facility] Dermatology Note      Dermatology Problem List:  1.Hx of AKs  - treated with efudex cream around 2015, plan to repeat Fall 2020 to left cheek  - s/p cryotherapy 3/13/2019   2. Irritated SK, left cheek: cryo    Encounter Date: Sep 17, 2020    CC:  Chief Complaint   Patient presents with     Derm Problem     reacheck face, cryo and discuss efudex         History of Present Illness:  Mr. Yovani Grayson is a 59 year old male who presents to discuss field treatment and to re-evaluate a lesion on the left cheek.    He was last seen for a virtual cisit on 9/9/20 when an SK was identified on the left cheek. This was being irritated by shaving, so plan was to evaluate in person and freeze as long as it was consistent with an SK. He was also noted to have a lot of precancerous change to the cheek, so plan was to evaluate the whole face and determine if field treatment is needed.    Today, he notes no new concerns. Spot on the left cheek is still irritated. Bothers him when he shaves. No spontaneous bleeding. Denies any tender, nonhealing, or bleeding skin lesions.    No other concerns addressed.       Past Medical History:   Patient Active Problem List   Diagnosis     Primary osteoarthritis of left hip     Cervical disc syndrome     Hyperlipidemia LDL goal <130     Other male erectile dysfunction     Testicular hypofunction     Tension headache     Benign localized prostatic hyperplasia with lower urinary tract symptoms (LUTS)     Combined arterial insufficiency and corporo-venous occlusive erectile dysfunction     Past Medical History:   Diagnosis Date     Benign localized prostatic hyperplasia with lower urinary tract symptoms (LUTS) 9/15/2020     Combined arterial insufficiency and corporo-venous occlusive erectile dysfunction 9/15/2020     Erectile dysfunction      Past Surgical History:   Procedure Laterality Date     HERNIORRHAPHY UMBILICAL         Social History:  Patient reports that he  has never smoked. He has never used smokeless tobacco. He reports that he does not drink alcohol or use drugs. Patient is a retired .   Reviewed and left in chart for clinician convenience.       Family History:  Family History   Problem Relation Age of Onset     Hypertension Father          age 59 lung cancer      Lung Cancer Father      Breast Cancer Mother      Diabetes No family hx of      Colon Cancer No family hx of      Coronary Artery Disease No family hx of      Skin Cancer No family hx of    Reviewed and left in chart for clinician convenience.       Medications:  Current Outpatient Medications   Medication Sig Dispense Refill     fluorouracil (EFUDEX) 5 % external cream Apply thin layer twice daily to left cheek for 2-4 weeks. 40 g 1     pravastatin (PRAVACHOL) 40 MG tablet Take 1 tablet (40 mg) by mouth daily 90 tablet 1     sildenafil (REVATIO) 20 MG tablet Take 1 tablet (20 mg) by mouth as needed (for intercourse) 10 tablet 11     tamsulosin (FLOMAX) 0.4 MG capsule Take 1 capsule (0.4 mg) by mouth daily 90 capsule 3     zolpidem (AMBIEN) 5 MG tablet Take 1 tablet (5 mg) by mouth nightly as needed for sleep 30 tablet 1     ibuprofen (ADVIL/MOTRIN) 200 MG tablet Take 800 mg by mouth every 4 hours as needed for mild pain Taking 800 mg daily, prn         No Known Allergies    Review of Systems:  -Constitutional: Patient is otherwise feeling well, in usual state of health.   -Skin: As above in HPI. No additional skin concerns.    Physical exam:  Vitals: There were no vitals taken for this visit.  GEN: This is a well developed, well-nourished male in no acute distress, in a pleasant mood.    SKIN: Focused examination of the face, neck, upper extremities was completed.  - Darnell Type II-III  - Gritty pink papule on the right eyebrow and diffusely involving the left preauricular cheek.   - There is a waxy stuck on tan to brown papule on the left preauricular cheek  - No other lesions  of concern on areas examined.       Impression/Plan:  1. Inflamed and symptomatic SK, left cheek  - Cryotherapy procedure note: After verbal consent and discussion of risks and benefits including but no limited to dyspigmentation/scar, blister, and pain, 1 SK was treated with 1-2mm freeze border for 2 cycles with liquid nitrogen. Post cryotherapy instructions were provided.    2. Diffuse AKs, left preauricular cheek and one AK localized to the R eyebrow: Will treat lesion at R eyebrow with cryo and have patient do field treatment with efudex cream to L preauricular cheek. Instructed to use BID x2-4 weeks as tolerated. Discussed treatment expectations. Gave handout on this.    CC Eva Viramontes PA-C on close of this encounter.    Follow-up 6 months for skin exam, sooner for lesions of concern.       Staff Involved:  Staff only    Juani Mayo MD    Department of Dermatology  [facility]: Phone: [phone], Fax:[phone]  [facility]: Phone: [phone], Fax: [phone]

## 2020-09-17 NOTE — LETTER
9/17/2020         RE: Yovani Grayson  [address]   [place] [address]        Dear Colleague,    Thank you for referring your patient, Yovani Grayson, to the [facility]. Please see a copy of my visit note below.    [facility] Dermatology Note      Dermatology Problem List:  1.Hx of AKs  - treated with efudex cream around 2015, plan to repeat Fall 2020 to left cheek  - s/p cryotherapy 3/13/2019   2. Irritated SK, left cheek: cryo    Encounter Date: Sep 17, 2020    CC:  Chief Complaint   Patient presents with     Derm Problem     reacheck face, cryo and discuss efudex         History of Present Illness:  Mr. Yovani Grayson is a 59 year old male who presents to discuss field treatment and to re-evaluate a lesion on the left cheek.    He was last seen for a virtual cisit on 9/9/20 when an SK was identified on the left cheek. This was being irritated by shaving, so plan was to evaluate in person and freeze as long as it was consistent with an SK. He was also noted to have a lot of precancerous change to the cheek, so plan was to evaluate the whole face and determine if field treatment is needed.    Today, he notes no new concerns. Spot on the left cheek is still irritated. Bothers him when he shaves. No spontaneous bleeding. Denies any tender, nonhealing, or bleeding skin lesions.    No other concerns addressed.       Past Medical History:   Patient Active Problem List   Diagnosis     Primary osteoarthritis of left hip     Cervical disc syndrome     Hyperlipidemia LDL goal <130     Other male erectile dysfunction     Testicular hypofunction     Tension headache     Benign localized prostatic hyperplasia with lower urinary tract symptoms (LUTS)     Combined arterial insufficiency and corporo-venous occlusive erectile dysfunction     Past Medical History:   Diagnosis Date     Benign localized prostatic hyperplasia with lower urinary tract symptoms (LUTS) 9/15/2020      Combined arterial insufficiency and corporo-venous occlusive erectile dysfunction 9/15/2020     Erectile dysfunction      Past Surgical History:   Procedure Laterality Date     HERNIORRHAPHY UMBILICAL         Social History:  Patient reports that he has never smoked. He has never used smokeless tobacco. He reports that he does not drink alcohol or use drugs. Patient is a retired .   Reviewed and left in chart for clinician convenience.       Family History:  Family History   Problem Relation Age of Onset     Hypertension Father          age 59 lung cancer      Lung Cancer Father      Breast Cancer Mother      Diabetes No family hx of      Colon Cancer No family hx of      Coronary Artery Disease No family hx of      Skin Cancer No family hx of    Reviewed and left in chart for clinician convenience.       Medications:  Current Outpatient Medications   Medication Sig Dispense Refill     fluorouracil (EFUDEX) 5 % external cream Apply thin layer twice daily to left cheek for 2-4 weeks. 40 g 1     pravastatin (PRAVACHOL) 40 MG tablet Take 1 tablet (40 mg) by mouth daily 90 tablet 1     sildenafil (REVATIO) 20 MG tablet Take 1 tablet (20 mg) by mouth as needed (for intercourse) 10 tablet 11     tamsulosin (FLOMAX) 0.4 MG capsule Take 1 capsule (0.4 mg) by mouth daily 90 capsule 3     zolpidem (AMBIEN) 5 MG tablet Take 1 tablet (5 mg) by mouth nightly as needed for sleep 30 tablet 1     ibuprofen (ADVIL/MOTRIN) 200 MG tablet Take 800 mg by mouth every 4 hours as needed for mild pain Taking 800 mg daily, prn         No Known Allergies    Review of Systems:  -Constitutional: Patient is otherwise feeling well, in usual state of health.   -Skin: As above in HPI. No additional skin concerns.    Physical exam:  Vitals: There were no vitals taken for this visit.  GEN: This is a well developed, well-nourished male in no acute distress, in a pleasant mood.    SKIN: Focused examination of the face, neck, upper  extremities was completed.  - Darnell Type II-III  - Gritty pink papule on the right eyebrow and diffusely involving the left preauricular cheek.   - There is a waxy stuck on tan to brown papule on the left preauricular cheek  - No other lesions of concern on areas examined.       Impression/Plan:  1. Inflamed and symptomatic SK, left cheek  - Cryotherapy procedure note: After verbal consent and discussion of risks and benefits including but no limited to dyspigmentation/scar, blister, and pain, 1 SK was treated with 1-2mm freeze border for 2 cycles with liquid nitrogen. Post cryotherapy instructions were provided.    2. Diffuse AKs, left preauricular cheek and one AK localized to the R eyebrow: Will treat lesion at R eyebrow with cryo and have patient do field treatment with efudex cream to L preauricular cheek. Instructed to use BID x2-4 weeks as tolerated. Discussed treatment expectations. Gave handout on this.    CC Eva Viramontes PA-C on close of this encounter.    Follow-up 6 months for skin exam, sooner for lesions of concern.       Staff Involved:  Staff only    Juani Mayo MD    Department of Dermatology  [facility]: Phone: [phone], Fax:[phone]  [facility]: Phone: [phone], Fax: [phone]              Again, thank you for allowing me to participate in the care of your patient.        Sincerely,        Juani Mayo MD

## 2020-09-17 NOTE — NURSING NOTE
Yovani Grayson's goals for this visit include:   Chief Complaint   Patient presents with     Derm Problem     reacheck face, cryo and discuss efudex       He requests these members of his care team be copied on today's visit information: no    PCP: [facility]    Referring Provider:  No referring provider defined for this encounter.    There were no vitals taken for this visit.    Do you need any medication refills at today's visit? No    Selam Castle LPN

## 2020-09-17 NOTE — PATIENT INSTRUCTIONS
Cryotherapy    What is it?    Use of a very cold liquid, such as liquid nitrogen, to freeze and destroy abnormal skin cells that need to be removed    What should I expect?    Tenderness and redness    A small blister that might grow and fill with dark purple blood. There may be crusting.    More than one treatment may be needed if the lesions do not go away.    How do I care for the treated area?    Gently wash the area with your hands when bathing.    Use a thin layer of Vaseline to help with healing. You may use a Band-Aid.     The area should heal within 7-10 days and may leave behind a pink or lighter color.     Do not use an antibiotic or Neosporin ointment.     You may take acetaminophen (Tylenol) for pain.     Call your Doctor if you have:    Severe pain    Signs of infection (warmth, redness, cloudy yellow drainage, and or a bad smell)    Questions or concerns    Who should I call with questions?       [facility]: [phone]       [facility]: [phone]       For urgent needs outside of business hours call the [facility] at [phone]        and ask for the dermatology resident on call

## 2020-09-21 ENCOUNTER — ANCILLARY PROCEDURE (OUTPATIENT)
Dept: CT IMAGING | Facility: CLINIC | Age: 60
End: 2020-09-21
Attending: INTERNAL MEDICINE
Payer: COMMERCIAL

## 2020-09-21 DIAGNOSIS — R07.9 LEFT-SIDED CHEST PAIN: ICD-10-CM

## 2020-09-21 PROCEDURE — 71250 CT THORAX DX C-: CPT | Performed by: RADIOLOGY

## 2020-10-14 DIAGNOSIS — G47.00 INSOMNIA, UNSPECIFIED TYPE: ICD-10-CM

## 2020-10-16 RX ORDER — ZOLPIDEM TARTRATE 5 MG/1
5 TABLET ORAL
Qty: 30 TABLET | Refills: 0 | Status: SHIPPED | OUTPATIENT
Start: 2020-10-16 | End: 2020-10-21

## 2020-10-16 NOTE — TELEPHONE ENCOUNTER
Requested Prescriptions   Pending Prescriptions Disp Refills     zolpidem (AMBIEN) 5 MG tablet 30 tablet 1     Sig: Take 1 tablet (5 mg) by mouth nightly as needed for sleep       There is no refill protocol information for this order        Routing refill request to provider for review/approval because:  Drug not on the [facility] refill protocol         Cady Gonzalez RN, BSN, PHN

## 2020-10-16 NOTE — TELEPHONE ENCOUNTER
It appears he is using this medicine more often.  Will give 1 more refill.  Needs virtual visit for further refills after this.  Please let patient know.  Thank you,  COURTNEY Felipe, NP-C  [facility]

## 2020-10-21 ENCOUNTER — VIRTUAL VISIT (OUTPATIENT)
Dept: FAMILY MEDICINE | Facility: CLINIC | Age: 60
End: 2020-10-21
Payer: COMMERCIAL

## 2020-10-21 DIAGNOSIS — G47.00 INSOMNIA, UNSPECIFIED TYPE: Primary | ICD-10-CM

## 2020-10-21 DIAGNOSIS — R35.1 NOCTURIA: ICD-10-CM

## 2020-10-21 DIAGNOSIS — R39.9 LOWER URINARY TRACT SYMPTOMS (LUTS): ICD-10-CM

## 2020-10-21 DIAGNOSIS — N52.9 ERECTILE DYSFUNCTION, UNSPECIFIED ERECTILE DYSFUNCTION TYPE: ICD-10-CM

## 2020-10-21 DIAGNOSIS — M25.552 LEFT HIP PAIN: ICD-10-CM

## 2020-10-21 DIAGNOSIS — R39.15 URINARY URGENCY: ICD-10-CM

## 2020-10-21 PROCEDURE — 99213 OFFICE O/P EST LOW 20 MIN: CPT | Mod: TEL | Performed by: NURSE PRACTITIONER

## 2020-10-21 RX ORDER — ZOLPIDEM TARTRATE 5 MG/1
5 TABLET ORAL
Qty: 90 TABLET | Refills: 1 | Status: SHIPPED | OUTPATIENT
Start: 2020-10-21

## 2020-10-21 RX ORDER — TAMSULOSIN HYDROCHLORIDE 0.4 MG/1
0.4 CAPSULE ORAL DAILY
Qty: 90 CAPSULE | Refills: 0 | Status: SHIPPED | OUTPATIENT
Start: 2020-10-21 | End: 2020-12-10

## 2020-10-21 RX ORDER — SILDENAFIL CITRATE 20 MG/1
20 TABLET ORAL PRN
Qty: 10 TABLET | Refills: 2 | Status: SHIPPED | OUTPATIENT
Start: 2020-10-21

## 2020-10-21 NOTE — PROGRESS NOTES
"Yovani Grayson is a 59 year old male who is being evaluated via a billable telephone visit.      The patient has been notified of following:     \"This telephone visit will be conducted via a call between you and your physician/provider. We have found that certain health care needs can be provided without the need for a physical exam.  This service lets us provide the care you need with a short phone conversation.  If a prescription is necessary we can send it directly to your pharmacy.  If lab work is needed we can place an order for that and you can then stop by our lab to have the test done at a later time.    Telephone visits are billed at different rates depending on your insurance coverage. During this emergency period, for some insurers they may be billed the same as an in-person visit.  Please reach out to your insurance provider with any questions.    If during the course of the call the physician/provider feels a telephone visit is not appropriate, you will not be charged for this service.\"    Patient has given verbal consent for Telephone visit?  Yes    What phone number would you like to be contacted at? [phone]    How would you like to obtain your AVS? [place]     Yovani Grayson is a 59 year old male who presents via phone visit today for the following health issues:    HPI     Medication Followup of Ambien    Taking Medication as prescribed: yes    Side Effects:  None    Medication Helping Symptoms:  yes     Michaela Marte MA    Started Ambien in June 2020. Takes it nightly now. Works well.  Stays up to about 10-10:30p, 15 min before he goes to bed, lays down and helps to turn his mind off. Wakes up around 2-2:30a to void, but then is able to go back to sleep til about 7 am. Had tried a few times without it and cannot turn his mind off. Sometimes a little groggy in the morning, he is retired though.     No smoking.   Alcohol: before the ambien he was drinking more to help him " sleep but he doesn't do that anymore. Rare alcohol use.     Will send refills for flomax and revatio.    Left hip issues: saw an orthopedic provider in the past. Moderate arthritis in left hip as of 2/7/2019. His pain is slowly worsening. He will follow up with an orthopedic provider.      He might be switching insurance and may need another provider in the future.         Review of Systems   Constitutional, HEENT, cardiovascular, pulmonary, gi and gu systems are negative, except as otherwise noted.       Objective          Vitals:  No vitals were obtained today due to virtual visit.    healthy, alert and no distress  PSYCH: Alert and oriented times 3; coherent speech, normal   rate and volume, able to articulate logical thoughts, able   to abstract reason, no tangential thoughts, no hallucinations   or delusions  His affect is normal  RESP: No cough, no audible wheezing, able to talk in full sentences  Remainder of exam unable to be completed due to telephone visits    No results found for this or any previous visit (from the past 24 hour(s)).        Assessment/Plan:    Assessment & Plan     Insomnia, unspecified type  Stable continue  - zolpidem (AMBIEN) 5 MG tablet; Take 1 tablet (5 mg) by mouth nightly as needed for sleep    Urinary urgency  Stable, small refill sent  - tamsulosin (FLOMAX) 0.4 MG capsule; Take 1 capsule (0.4 mg) by mouth daily    Nocturia  As above  - tamsulosin (FLOMAX) 0.4 MG capsule; Take 1 capsule (0.4 mg) by mouth daily    Lower urinary tract symptoms (LUTS)  As above  - tamsulosin (FLOMAX) 0.4 MG capsule; Take 1 capsule (0.4 mg) by mouth daily    Erectile dysfunction, unspecified erectile dysfunction type  Stable, small refill sent  - sildenafil (REVATIO) 20 MG tablet; Take 1 tablet (20 mg) by mouth as needed (for intercourse)    Left hip pain  May need surgery due to osteoporosis. He will follow up with an orthopedic provider       BMI:   Estimated body mass index is 27.38 kg/m  as  "calculated from the following:    Height as of 9/15/20: 1.867 m (6' 1.5\").    Weight as of 9/15/20: 95.4 kg (210 lb 6.4 oz).            Return in about 6 months (around 4/21/2021) for Phone or Video visit okay.  Patient may have new insurance, will follow up with NP or a different provider pending his insurance at that time    COURTNEY Felipe, NP-C  [facility]    Phone call duration:  11 minutes                "

## 2020-11-11 ENCOUNTER — MYC MEDICAL ADVICE (OUTPATIENT)
Dept: ORTHOPEDICS | Facility: CLINIC | Age: 60
End: 2020-11-11

## 2020-11-11 DIAGNOSIS — M16.12 PRIMARY OSTEOARTHRITIS OF LEFT HIP: Primary | ICD-10-CM

## 2020-11-18 ENCOUNTER — OFFICE VISIT (OUTPATIENT)
Dept: ORTHOPEDICS | Facility: CLINIC | Age: 60
End: 2020-11-18
Attending: PHYSICIAN ASSISTANT
Payer: COMMERCIAL

## 2020-11-18 VITALS
SYSTOLIC BLOOD PRESSURE: 115 MMHG | HEIGHT: 74 IN | WEIGHT: 210 LBS | BODY MASS INDEX: 26.95 KG/M2 | DIASTOLIC BLOOD PRESSURE: 80 MMHG

## 2020-11-18 DIAGNOSIS — M16.12 PRIMARY OSTEOARTHRITIS OF LEFT HIP: ICD-10-CM

## 2020-11-18 PROCEDURE — 20611 DRAIN/INJ JOINT/BURSA W/US: CPT | Mod: LT | Performed by: FAMILY MEDICINE

## 2020-11-18 PROCEDURE — 99203 OFFICE O/P NEW LOW 30 MIN: CPT | Mod: 25 | Performed by: FAMILY MEDICINE

## 2020-11-18 RX ORDER — TRIAMCINOLONE ACETONIDE 40 MG/ML
40 INJECTION, SUSPENSION INTRA-ARTICULAR; INTRAMUSCULAR
Status: SHIPPED | OUTPATIENT
Start: 2020-11-18

## 2020-11-18 RX ORDER — ROPIVACAINE HYDROCHLORIDE 5 MG/ML
3 INJECTION, SOLUTION EPIDURAL; INFILTRATION; PERINEURAL
Status: SHIPPED | OUTPATIENT
Start: 2020-11-18

## 2020-11-18 RX ADMIN — ROPIVACAINE HYDROCHLORIDE 3 ML: 5 INJECTION, SOLUTION EPIDURAL; INFILTRATION; PERINEURAL at 13:40

## 2020-11-18 RX ADMIN — TRIAMCINOLONE ACETONIDE 40 MG: 40 INJECTION, SUSPENSION INTRA-ARTICULAR; INTRAMUSCULAR at 13:40

## 2020-11-18 ASSESSMENT — MIFFLIN-ST. JEOR: SCORE: 1824.36

## 2020-11-18 NOTE — LETTER
2020         RE: Yovani Grayson  [address] [facility] [address]        Dear Colleague,    Thank you for referring your patient, Yovani Grayson, to the [facility]. Please see a copy of my visit note below.    oYvani Grayson  :  1960  DOS: 2020  MRN: [id number]    Sports Medicine Clinic Visit    PCP: Sherice Alexandra    Yovani Grayson is a 59 year old male who is seen in consultation at the request of  Bennett Finn PA-C presenting with chronic left hip pain.    Injury: Gradual onset of left hip pain over the past ~ 2 years, pain worsening over the past 3 - 4 months, mostly with walking.  Pain located over left deep anterior hip, nonradiating.  Additional Features:  Positive: grinding and weakness.  Symptoms are better with Tylenol and Rest.  Symptoms are worse with: prolonged walking, going from sit to stand position.  Other evaluation and/or treatments so far consists of: Ice, Tylenol, Rest and Ortho Surgery consult (Dileep).  Recent imaging completed: XR completed 2019.  Prior History of related problems: none    Social History: retired    Review of Systems  Musculoskeletal: as above  Remainder of review of systems is negative including constitutional, CV, pulmonary, GI, Skin and Neurologic except as noted in HPI or medical history.    Past Medical History:   Diagnosis Date     Benign localized prostatic hyperplasia with lower urinary tract symptoms (LUTS) 9/15/2020     Combined arterial insufficiency and corporo-venous occlusive erectile dysfunction 9/15/2020     Erectile dysfunction      Past Surgical History:   Procedure Laterality Date     HERNIORRHAPHY UMBILICAL       Family History   Problem Relation Age of Onset     Hypertension Father          age 59 lung cancer      Lung Cancer Father      Breast Cancer Mother      Diabetes No family hx of      Colon Cancer No family hx of      Coronary Artery Disease No family  "hx of      Skin Cancer No family hx of        Objective  Ht 1.867 m (6' 1.5\")   Wt 95.3 kg (210 lb)   BMI 27.33 kg/m        General: healthy, alert and in no distress      HEENT: no scleral icterus or conjunctival erythema     Skin: no suspicious lesions or rash. No jaundice.     CV: regular rhythm by palpation, 2+ distal pulses, no pedal edema      Resp: normal respiratory effort without conversational dyspnea     Psych: normal mood and affect      Gait: mildly antalgic, appropriate coordination and balance     Neuro: normal light touch sensory exam of the extremities. Motor strength as noted below     Left hip exam    Inspection:        no edema or ecchymosis in hip area    ROM:       Flexion 100       internal rotation 15      external rotation 30      Range of motion limited by pain    Strength:        flexion 5/5       extension 5/5       abduction 5/5       adduction 5/5    Tender:        greater trochanter mildly        Anterior hip joint       Mild ipsilateral SI joint TTP    Non Tender:        remainder of hip area       illiac crest       ASIS       pubis    Sensation:        grossly intact in hip and thigh    Skin:       well perfused       capillary refill brisk    Special Tests:        neg (-) KATHARINA       positive (+) FADIR       positive (+) log roll       neg (-) Kristie      Radiology  XR HIP LEFT 2-3 VIEWS 2/7/2019 10:21 AM     HISTORY: Left hip pain.     COMPARISON: None.     FINDINGS: Moderate osteoarthritis at the left hip characterized by  some loss of joint space, subchondral sclerosis and marginal  osteophyte formation. No fracture or malalignment.                                                                      IMPRESSION: Moderate osteoarthritis.      Large Joint Injection/Arthocentesis: L hip joint    Date/Time: 11/18/2020 1:40 PM  Performed by: Tommy Katz DO  Authorized by: Tommy Katz DO     Indications:  Pain and diagnostic evaluation  Needle Size:  22 " G  Guidance: ultrasound    Approach:  Anterior  Location:  Hip      Site:  L hip joint  Medications:  3 mL ropivacaine 5 MG/ML; 40 mg triamcinolone 40 MG/ML  Aspirate amount (mL) comment:  Trace amount  Aspirate:  Serous  Outcome:  Tolerated well, no immediate complications  Procedure discussed: discussed risks, benefits, and alternatives    Consent Given by:  Patient  Timeout: timeout called immediately prior to procedure    Prep: patient was prepped and draped in usual sterile fashion     4 ml's of 1% lidocaine was used as local anesthetic prior to injection  Synovial cyst was noted on ultrasound.        Assessment:  1. Primary osteoarthritis of left hip        Plan:  Discussed the assessment with the patient.  Follow up: prn based on clinical progress, insurance, plan for tx  Clear underlying EMMY with now advanced OA in the hip joint, chronic related pain, managed this far with exercise  Reviewed option for MARTHA discussion, especially if relief from injection is only temporary  Changing health systems in 2021, looking for surgeon recommendation in [facility] [place] area  PT options, low impact activity strategies reviewed, continue activity as tolerated  XR images independently visualized and reviewed with patient today in clinic  Expectations and goals of CSI reviewed  Often 2-3 days for steroid effect, and can take up to two weeks for maximum effect  We discussed modified progressive pain-free activity as tolerated  Do not overuse in first two weeks if feeling better due to concern for vulnerability while steroid is working  Supportive care reviewed  All questions were answered today  Contact us with additional questions or concerns  Signs and sx of concern reviewed      Tommy Katz DO, CAQ  Primary Care Sports Medicine  [facility]             Disclaimer: This note consists of symbols derived from keyboarding, dictation and/or voice recognition software. As a result, there may be  errors in the script that have gone undetected. Please consider this when interpreting information found in this chart.      Again, thank you for allowing me to participate in the care of your patient.        Sincerely,        Tommy Katz, DO

## 2020-11-18 NOTE — PROGRESS NOTES
"Yovani Grayson  :  1960  DOS: 2020  MRN: [id number]    Sports Medicine Clinic Visit    PCP: Sherice Alexandra    Yovani Grayson is a 59 year old male who is seen in consultation at the request of  Bennett Finn PA-C presenting with chronic left hip pain.    Injury: Gradual onset of left hip pain over the past ~ 2 years, pain worsening over the past 3 - 4 months, mostly with walking.  Pain located over left deep anterior hip, nonradiating.  Additional Features:  Positive: grinding and weakness.  Symptoms are better with Tylenol and Rest.  Symptoms are worse with: prolonged walking, going from sit to stand position.  Other evaluation and/or treatments so far consists of: Ice, Tylenol, Rest and Ortho Surgery consult (Dileep).  Recent imaging completed: XR completed 2019.  Prior History of related problems: none    Social History: retired    Review of Systems  Musculoskeletal: as above  Remainder of review of systems is negative including constitutional, CV, pulmonary, GI, Skin and Neurologic except as noted in HPI or medical history.    Past Medical History:   Diagnosis Date     Benign localized prostatic hyperplasia with lower urinary tract symptoms (LUTS) 9/15/2020     Combined arterial insufficiency and corporo-venous occlusive erectile dysfunction 9/15/2020     Erectile dysfunction      Past Surgical History:   Procedure Laterality Date     HERNIORRHAPHY UMBILICAL       Family History   Problem Relation Age of Onset     Hypertension Father          age 59 lung cancer      Lung Cancer Father      Breast Cancer Mother      Diabetes No family hx of      Colon Cancer No family hx of      Coronary Artery Disease No family hx of      Skin Cancer No family hx of        Objective  Ht 1.867 m (6' 1.5\")   Wt 95.3 kg (210 lb)   BMI 27.33 kg/m        General: healthy, alert and in no distress      HEENT: no scleral icterus or conjunctival erythema     Skin: no suspicious lesions or rash. No " jaundice.     CV: regular rhythm by palpation, 2+ distal pulses, no pedal edema      Resp: normal respiratory effort without conversational dyspnea     Psych: normal mood and affect      Gait: mildly antalgic, appropriate coordination and balance     Neuro: normal light touch sensory exam of the extremities. Motor strength as noted below     Left hip exam    Inspection:        no edema or ecchymosis in hip area    ROM:       Flexion 100       internal rotation 15      external rotation 30      Range of motion limited by pain    Strength:        flexion 5/5       extension 5/5       abduction 5/5       adduction 5/5    Tender:        greater trochanter mildly        Anterior hip joint       Mild ipsilateral SI joint TTP    Non Tender:        remainder of hip area       illiac crest       ASIS       pubis    Sensation:        grossly intact in hip and thigh    Skin:       well perfused       capillary refill brisk    Special Tests:        neg (-) KATHARINA       positive (+) FADIR       positive (+) log roll       neg (-) Kristie      Radiology  XR HIP LEFT 2-3 VIEWS 2/7/2019 10:21 AM     HISTORY: Left hip pain.     COMPARISON: None.     FINDINGS: Moderate osteoarthritis at the left hip characterized by  some loss of joint space, subchondral sclerosis and marginal  osteophyte formation. No fracture or malalignment.                                                                      IMPRESSION: Moderate osteoarthritis.      Large Joint Injection/Arthocentesis: L hip joint    Date/Time: 11/18/2020 1:40 PM  Performed by: Tommy Katz DO  Authorized by: Tommy Katz DO     Indications:  Pain and diagnostic evaluation  Needle Size:  22 G  Guidance: ultrasound    Approach:  Anterior  Location:  Hip      Site:  L hip joint  Medications:  3 mL ropivacaine 5 MG/ML; 40 mg triamcinolone 40 MG/ML  Aspirate amount (mL) comment:  Trace amount  Aspirate:  Serous  Outcome:  Tolerated well, no immediate  complications  Procedure discussed: discussed risks, benefits, and alternatives    Consent Given by:  Patient  Timeout: timeout called immediately prior to procedure    Prep: patient was prepped and draped in usual sterile fashion     4 ml's of 1% lidocaine was used as local anesthetic prior to injection  Synovial cyst was noted on ultrasound.        Assessment:  1. Primary osteoarthritis of left hip        Plan:  Discussed the assessment with the patient.  Follow up: prn based on clinical progress, insurance, plan for tx  Clear underlying EMMY with now advanced OA in the hip joint, chronic related pain, managed this far with exercise  Reviewed option for MARTHA discussion, especially if relief from injection is only temporary  Changing health systems in 2021, looking for surgeon recommendation in [facility] [employer] area  PT options, low impact activity strategies reviewed, continue activity as tolerated  XR images independently visualized and reviewed with patient today in clinic  Expectations and goals of CSI reviewed  Often 2-3 days for steroid effect, and can take up to two weeks for maximum effect  We discussed modified progressive pain-free activity as tolerated  Do not overuse in first two weeks if feeling better due to concern for vulnerability while steroid is working  Supportive care reviewed  All questions were answered today  Contact us with additional questions or concerns  Signs and sx of concern reviewed      Tommy Katz DO, EDEN  Primary Care Sports Medicine  [facility]             Disclaimer: This note consists of symbols derived from keyboarding, dictation and/or voice recognition software. As a result, there may be errors in the script that have gone undetected. Please consider this when interpreting information found in this chart.

## 2020-11-18 NOTE — PROGRESS NOTES
"Yovani Grayson  :  1960  DOS: 2020  MRN: [id number]    Sports Medicine Clinic Procedure    Ultrasound Guided Left Intra-Articular Hip Injection    Clinical History: history of chronic left hip pain over the past ~ 2+ years, that is worsening over the past ~ 6 months.  Pain located over deep anterior hip and is worse with going from sit to stand & walking.    Diagnosis:   1. Primary osteoarthritis of left hip      Referring Physician: Triston Falk MD  Procedures      Impression:  Successful {RIGHT/LEFT:386504::\"Bilateral\"} intra-articular hip injection.    Plan:  Follow up ***  Expectations and goals of CSI reviewed  Often 2-3 days for steroid effect, and can take up to two weeks for maximum effect  We discussed modified progressive pain-free activity as tolerated  Do not overuse in first two weeks if feeling better due to concern for vulnerability while steroid is working  Supportive care reviewed  All questions were answered today  Contact us with additional questions or concerns  Signs and sx of concern reviewed      Tommy Katz DO, CAJAUN  Primary Care Sports Medicine  [facility]     "

## 2020-12-09 DIAGNOSIS — R39.15 URINARY URGENCY: ICD-10-CM

## 2020-12-09 DIAGNOSIS — R35.1 NOCTURIA: ICD-10-CM

## 2020-12-09 DIAGNOSIS — R39.9 LOWER URINARY TRACT SYMPTOMS (LUTS): ICD-10-CM

## 2020-12-10 RX ORDER — TAMSULOSIN HYDROCHLORIDE 0.4 MG/1
0.4 CAPSULE ORAL DAILY
Qty: 90 CAPSULE | Refills: 0 | Status: SHIPPED | OUTPATIENT
Start: 2020-12-10

## 2020-12-10 NOTE — TELEPHONE ENCOUNTER
tamsulosin (FLOMAX) 0.4 MG capsule   Take 1 capsule (0.4 mg) by mouth daily      Last Written Prescription Date:  10/21/20  Last Fill Quantity: 90,   # refills: 0  Last Office Visit : 3/10/20  Future Office visit:  none    Routing refill request to provider for review/approval because:  Failed refill protocol - sildenafil on med list

## 2021-02-01 ENCOUNTER — TELEPHONE (OUTPATIENT)
Dept: UROLOGY | Facility: CLINIC | Age: 61
End: 2021-02-01

## 2021-02-01 NOTE — TELEPHONE ENCOUNTER
Called patient and spoke to.  Patient is due for a return visit with Dr Andriy keane in March 2021.  Patient declined to schedule at this time. Patient states his new insurance does not cover [place] and he will need to establish care with another provider that is in network.    Routing to urology clinic as an FYI.      Fawn Rodriguez  Surgical Specialties Procedure   [place]  2/1/2021 1:54 PM

## 2021-02-01 NOTE — TELEPHONE ENCOUNTER
----- Message from Teresita Overton RN sent at 2/1/2021 10:33 AM CST -----  Hel,    When you get a chance, would you be able to contact patient to schedule virtual or in-person visit with Dr. Ashraf in March.    Thank you,    Teresita Overton RN, BSN    ----- Message -----  From: Teresita Overton RN  Sent: 1/11/2021  To: [facility] [place]      ----- Message -----  From: Sunita Huang LPN  Sent: 3/10/2020   1:09 PM CDT  To: [facility] [place]    Please call patient to schedule 1 year follow up with Dr. Ashraf for March, 2021.    Thank you!  Sunita

## 2021-04-11 ENCOUNTER — HEALTH MAINTENANCE LETTER (OUTPATIENT)
Age: 61
End: 2021-04-11

## 2021-09-26 ENCOUNTER — HEALTH MAINTENANCE LETTER (OUTPATIENT)
Age: 61
End: 2021-09-26

## 2022-05-08 ENCOUNTER — HEALTH MAINTENANCE LETTER (OUTPATIENT)
Age: 62
End: 2022-05-08

## 2022-09-26 NOTE — NURSING NOTE
----- Message from Sunita Workman MD sent at 9/26/2022  5:26 PM CDT -----  Please inform the patient that these results are within normal limits.   Yovani Grayson's goals for this visit include:   Chief Complaint   Patient presents with     Consult     Muscle tension headache, fatigue, brain fog, joint pain       He requests these members of his care team be copied on today's visit information:     PCP: [facility]    Referring Provider:  Barbara Curtis MD  [address] [place] [address]    /80 (BP Location: Left arm, Patient Position: Sitting, Cuff Size: Adult Large)   Pulse 69   Wt 94.1 kg (207 lb 8 oz)   SpO2 98%   BMI 27.01 kg/m      Do you need any medication refills at today's visit? No

## 2023-01-08 ENCOUNTER — HEALTH MAINTENANCE LETTER (OUTPATIENT)
Age: 63
End: 2023-01-08

## 2024-02-10 ENCOUNTER — HEALTH MAINTENANCE LETTER (OUTPATIENT)
Age: 64
End: 2024-02-10

## 2025-01-01 NOTE — NURSING NOTE
Yovani Grayson's goals for this visit include:   Chief Complaint   Patient presents with     Follow Up     3 mo follow up- [facility]. Flomax has improved symtpoms       He requests these members of his care team be copied on today's visit information:     [facility]    Referring Provider:  No referring provider defined for this encounter.    /83 (BP Location: Left arm, Patient Position: Sitting, Cuff Size: Adult Regular)   Pulse 84   SpO2 98%     Do you need any medication refills at today's visit? No    Dee Saini LPN     01-Jan-2025 02:29